# Patient Record
Sex: MALE | Race: WHITE | NOT HISPANIC OR LATINO | Employment: OTHER | URBAN - METROPOLITAN AREA
[De-identification: names, ages, dates, MRNs, and addresses within clinical notes are randomized per-mention and may not be internally consistent; named-entity substitution may affect disease eponyms.]

---

## 2017-12-28 ENCOUNTER — OFFICE VISIT (OUTPATIENT)
Dept: CARDIOLOGY | Facility: CLINIC | Age: 37
End: 2017-12-28

## 2017-12-28 VITALS
WEIGHT: 194 LBS | BODY MASS INDEX: 27.77 KG/M2 | HEART RATE: 57 BPM | HEIGHT: 70 IN | SYSTOLIC BLOOD PRESSURE: 142 MMHG | DIASTOLIC BLOOD PRESSURE: 100 MMHG

## 2017-12-28 DIAGNOSIS — R07.2 PRECORDIAL PAIN: Primary | ICD-10-CM

## 2017-12-28 PROCEDURE — 99204 OFFICE O/P NEW MOD 45 MIN: CPT | Performed by: INTERNAL MEDICINE

## 2017-12-28 PROCEDURE — 93000 ELECTROCARDIOGRAM COMPLETE: CPT | Performed by: INTERNAL MEDICINE

## 2017-12-28 RX ORDER — AMLODIPINE BESYLATE 2.5 MG/1
2.5 TABLET ORAL DAILY
Qty: 30 TABLET | Refills: 11 | Status: SHIPPED | OUTPATIENT
Start: 2017-12-28 | End: 2019-03-27 | Stop reason: SDUPTHER

## 2018-01-08 NOTE — PROGRESS NOTES
Subjective:     Encounter Date:12/28/2017      Patient ID: Julius Gonzales is a 37 y.o. male.    Chief Complaint:  Hypertension   This is a chronic problem. The current episode started more than 1 month ago. Associated symptoms include chest pain and peripheral edema. Pertinent negatives include no malaise/fatigue or shortness of breath.   Chest Pain    This is a recurrent problem. The current episode started more than 1 month ago. The problem occurs intermittently. The problem has been waxing and waning. Pertinent negatives include no malaise/fatigue or shortness of breath.       37-year-old gentleman who presents today for evaluation.  Patient was recently seen by allergist in [facility].  Patient was on an ACE inhibitor and started to have angioedema.  He been on an ACE inhibitor for a long time.  He was first diagnosed with hypertension in his 20s.  He's been on multiple medications and unfortunately has not tolerated several of them.  The last was lisinopril which clearly gave him symptoms of angioedema.  He finally showed me a picture of where his lipid actually swallow which is obviously classic.  He wished to find a new cardiologist and he subsequently presented to my office for further evaluation.             Review of Systems   Constitution: Negative for malaise/fatigue.   Cardiovascular: Positive for chest pain.   Respiratory: Negative for shortness of breath.    Psychiatric/Behavioral: The patient is nervous/anxious.    All other systems reviewed and are negative.        ECG 12 Lead  Date/Time: 12/28/2017 4:43 PM  Performed by: ANEL GIBBONS  Authorized by: ANEL GIBBONS   Comparison: compared with previous ECG from 10/6/2017  Similar to previous ECG  Rhythm: sinus rhythm  Clinical impression: normal ECG               Objective:     Physical Exam   Constitutional: He is oriented to person, place, and time. He appears well-developed.   HENT:   Head: Normocephalic.   Eyes: Conjunctivae are normal.    Neck: Normal range of motion.   Cardiovascular: Normal rate, regular rhythm and normal heart sounds.    Pulmonary/Chest: Breath sounds normal.   Abdominal: Soft. Bowel sounds are normal.   Musculoskeletal: Normal range of motion. He exhibits no edema.   Neurological: He is alert and oriented to person, place, and time.   Skin: Skin is warm and dry.   Psychiatric: He has a normal mood and affect. His behavior is normal.   Vitals reviewed.      Lab Review:       Assessment:          Diagnosis Plan   1. Precordial pain  Adult Transthoracic Echo Complete W/ Cont if Necessary Per Protocol          Plan:        1.  Hypertension.  Patient clearly has angioedema from ACE inhibitor.  He's also taken and are which had similar reaction.  He also had some issues with hydrochlorothiazide which probably makes him sensitive to a sulfa-based medicine in addition.  I will initiate Norvasc 2.5 mg and see how he responds.  Blood pressures up today not terrible.  Multiple surgical and ascending to Dr. Lyons of allergy for another opinion and possible desensitization.  Patient's total follow-up in 4 weeks for reassessment.  2.  Patient's having some atypical chest discomfort.  At this point I don't think it represents a cardiac etiology but I am when a set him up for an echocardiogram for multiple reasons.  I will see how thick his LV walls are as well as look for wall motion abnormalities.  At this point it could be his blood pressure causing some of his symptoms.  3.  Will address his smokeless tobacco on next visit.    4. Follow-up 4 - 6 weeks

## 2018-02-06 ENCOUNTER — APPOINTMENT (OUTPATIENT)
Dept: CARDIOLOGY | Facility: HOSPITAL | Age: 38
End: 2018-02-06
Attending: INTERNAL MEDICINE

## 2018-02-06 DIAGNOSIS — R07.2 PRECORDIAL PAIN: Primary | ICD-10-CM

## 2018-03-01 ENCOUNTER — OFFICE VISIT CONVERTED (OUTPATIENT)
Dept: FAMILY MEDICINE CLINIC | Facility: CLINIC | Age: 38
End: 2018-03-01
Attending: NURSE PRACTITIONER

## 2018-03-30 ENCOUNTER — OFFICE VISIT (OUTPATIENT)
Dept: CARDIOLOGY | Facility: CLINIC | Age: 38
End: 2018-03-30

## 2018-03-30 ENCOUNTER — HOSPITAL ENCOUNTER (OUTPATIENT)
Dept: CARDIOLOGY | Facility: HOSPITAL | Age: 38
Discharge: HOME | End: 2018-03-30
Attending: INTERNAL MEDICINE | Admitting: INTERNAL MEDICINE

## 2018-03-30 VITALS
HEART RATE: 69 BPM | BODY MASS INDEX: 27.77 KG/M2 | OXYGEN SATURATION: 98 % | SYSTOLIC BLOOD PRESSURE: 132 MMHG | DIASTOLIC BLOOD PRESSURE: 90 MMHG | HEIGHT: 70 IN | WEIGHT: 194 LBS

## 2018-03-30 VITALS
HEIGHT: 70 IN | WEIGHT: 194 LBS | SYSTOLIC BLOOD PRESSURE: 132 MMHG | BODY MASS INDEX: 27.77 KG/M2 | HEART RATE: 76 BPM | DIASTOLIC BLOOD PRESSURE: 90 MMHG

## 2018-03-30 DIAGNOSIS — R07.2 PRECORDIAL PAIN: ICD-10-CM

## 2018-03-30 DIAGNOSIS — I10 ESSENTIAL HYPERTENSION: Primary | ICD-10-CM

## 2018-03-30 LAB
ASCENDING AORTA: 2.6 CM
BH CV ECHO MEAS - ACS: 2.1 CM
BH CV ECHO MEAS - AO MAX PG (FULL): 1 MMHG
BH CV ECHO MEAS - AO MAX PG: 5.3 MMHG
BH CV ECHO MEAS - AO MEAN PG (FULL): 0.85 MMHG
BH CV ECHO MEAS - AO MEAN PG: 3 MMHG
BH CV ECHO MEAS - AO ROOT AREA (BSA CORRECTED): 1.8
BH CV ECHO MEAS - AO ROOT AREA: 11.3 CM^2
BH CV ECHO MEAS - AO ROOT DIAM: 3.8 CM
BH CV ECHO MEAS - AO V2 MAX: 115.4 CM/SEC
BH CV ECHO MEAS - AO V2 MEAN: 81.1 CM/SEC
BH CV ECHO MEAS - AO V2 VTI: 24.5 CM
BH CV ECHO MEAS - AVA(I,A): 2.4 CM^2
BH CV ECHO MEAS - AVA(I,D): 2.4 CM^2
BH CV ECHO MEAS - AVA(V,A): 2.9 CM^2
BH CV ECHO MEAS - AVA(V,D): 2.9 CM^2
BH CV ECHO MEAS - BSA(HAYCOCK): 2.1 M^2
BH CV ECHO MEAS - BSA: 2.1 M^2
BH CV ECHO MEAS - BZI_BMI: 27.8 KILOGRAMS/M^2
BH CV ECHO MEAS - BZI_METRIC_HEIGHT: 177.8 CM
BH CV ECHO MEAS - BZI_METRIC_WEIGHT: 88 KG
BH CV ECHO MEAS - CONTRAST EF (2CH): 63.4 ML/M^2
BH CV ECHO MEAS - CONTRAST EF 4CH: 54.5 ML/M^2
BH CV ECHO MEAS - EDV(MOD-SP2): 41 ML
BH CV ECHO MEAS - EDV(MOD-SP4): 55 ML
BH CV ECHO MEAS - EDV(TEICH): 122.8 ML
BH CV ECHO MEAS - EF(CUBED): 84.7 %
BH CV ECHO MEAS - EF(MOD-SP2): 63.4 %
BH CV ECHO MEAS - EF(MOD-SP4): 54.5 %
BH CV ECHO MEAS - EF(TEICH): 77.6 %
BH CV ECHO MEAS - ESV(MOD-SP2): 15 ML
BH CV ECHO MEAS - ESV(MOD-SP4): 25 ML
BH CV ECHO MEAS - ESV(TEICH): 27.5 ML
BH CV ECHO MEAS - FS: 46.5 %
BH CV ECHO MEAS - IVS/LVPW: 1
BH CV ECHO MEAS - IVSD: 0.93 CM
BH CV ECHO MEAS - LAT PEAK E' VEL: 12 CM/SEC
BH CV ECHO MEAS - LV DIASTOLIC VOL/BSA (35-75): 26.7 ML/M^2
BH CV ECHO MEAS - LV MASS(C)D: 169.9 GRAMS
BH CV ECHO MEAS - LV MASS(C)DI: 82.5 GRAMS/M^2
BH CV ECHO MEAS - LV MAX PG: 4.3 MMHG
BH CV ECHO MEAS - LV MEAN PG: 2.2 MMHG
BH CV ECHO MEAS - LV SYSTOLIC VOL/BSA (12-30): 12.1 ML/M^2
BH CV ECHO MEAS - LV V1 MAX: 103.5 CM/SEC
BH CV ECHO MEAS - LV V1 MEAN: 66.3 CM/SEC
BH CV ECHO MEAS - LV V1 VTI: 18.4 CM
BH CV ECHO MEAS - LVIDD: 5.1 CM
BH CV ECHO MEAS - LVIDS: 2.7 CM
BH CV ECHO MEAS - LVLD AP2: 7.5 CM
BH CV ECHO MEAS - LVLD AP4: 6.7 CM
BH CV ECHO MEAS - LVLS AP2: 6.5 CM
BH CV ECHO MEAS - LVLS AP4: 6.2 CM
BH CV ECHO MEAS - LVOT AREA (M): 3.1 CM^2
BH CV ECHO MEAS - LVOT AREA: 3.2 CM^2
BH CV ECHO MEAS - LVOT DIAM: 2 CM
BH CV ECHO MEAS - LVPWD: 0.93 CM
BH CV ECHO MEAS - MED PEAK E' VEL: 8 CM/SEC
BH CV ECHO MEAS - MV A DUR: 0.13 SEC
BH CV ECHO MEAS - MV A MAX VEL: 60.6 CM/SEC
BH CV ECHO MEAS - MV DEC SLOPE: 471.6 CM/SEC^2
BH CV ECHO MEAS - MV DEC TIME: 0.18 SEC
BH CV ECHO MEAS - MV E MAX VEL: 89.7 CM/SEC
BH CV ECHO MEAS - MV E/A: 1.5
BH CV ECHO MEAS - MV MAX PG: 2.9 MMHG
BH CV ECHO MEAS - MV MEAN PG: 1.4 MMHG
BH CV ECHO MEAS - MV P1/2T MAX VEL: 89.7 CM/SEC
BH CV ECHO MEAS - MV P1/2T: 55.7 MSEC
BH CV ECHO MEAS - MV V2 MAX: 85.5 CM/SEC
BH CV ECHO MEAS - MV V2 MEAN: 55.8 CM/SEC
BH CV ECHO MEAS - MV V2 VTI: 29.3 CM
BH CV ECHO MEAS - MVA P1/2T LCG: 2.5 CM^2
BH CV ECHO MEAS - MVA(P1/2T): 3.9 CM^2
BH CV ECHO MEAS - MVA(VTI): 2 CM^2
BH CV ECHO MEAS - PA ACC TIME: 0.12 SEC
BH CV ECHO MEAS - PA MAX PG (FULL): 2.2 MMHG
BH CV ECHO MEAS - PA MAX PG: 4.3 MMHG
BH CV ECHO MEAS - PA PR(ACCEL): 23.5 MMHG
BH CV ECHO MEAS - PA V2 MAX: 103.2 CM/SEC
BH CV ECHO MEAS - PULM A REVS DUR: 0.11 SEC
BH CV ECHO MEAS - PULM A REVS VEL: 24.3 CM/SEC
BH CV ECHO MEAS - PULM DIAS VEL: 41.1 CM/SEC
BH CV ECHO MEAS - PULM S/D: 1.2
BH CV ECHO MEAS - PULM SYS VEL: 48.6 CM/SEC
BH CV ECHO MEAS - PVA(V,A): 2.1 CM^2
BH CV ECHO MEAS - PVA(V,D): 2.1 CM^2
BH CV ECHO MEAS - QP/QS: 0.78
BH CV ECHO MEAS - RAP SYSTOLE: 3 MMHG
BH CV ECHO MEAS - RV MAX PG: 2.1 MMHG
BH CV ECHO MEAS - RV MEAN PG: 1 MMHG
BH CV ECHO MEAS - RV V1 MAX: 72.3 CM/SEC
BH CV ECHO MEAS - RV V1 MEAN: 45.9 CM/SEC
BH CV ECHO MEAS - RV V1 VTI: 15.2 CM
BH CV ECHO MEAS - RVOT AREA: 3 CM^2
BH CV ECHO MEAS - RVOT DIAM: 2 CM
BH CV ECHO MEAS - RVSP: 16 MMHG
BH CV ECHO MEAS - SI(AO): 134.7 ML/M^2
BH CV ECHO MEAS - SI(CUBED): 54 ML/M^2
BH CV ECHO MEAS - SI(LVOT): 28.9 ML/M^2
BH CV ECHO MEAS - SI(MOD-SP2): 12.6 ML/M^2
BH CV ECHO MEAS - SI(MOD-SP4): 14.6 ML/M^2
BH CV ECHO MEAS - SI(TEICH): 46.3 ML/M^2
BH CV ECHO MEAS - SUP REN AO DIAM: 1.8 CM
BH CV ECHO MEAS - SV(AO): 277.5 ML
BH CV ECHO MEAS - SV(CUBED): 111.2 ML
BH CV ECHO MEAS - SV(LVOT): 59.5 ML
BH CV ECHO MEAS - SV(MOD-SP2): 26 ML
BH CV ECHO MEAS - SV(MOD-SP4): 30 ML
BH CV ECHO MEAS - SV(RVOT): 46.3 ML
BH CV ECHO MEAS - SV(TEICH): 95.3 ML
BH CV ECHO MEAS - TAPSE (>1.6): 2 CM2
BH CV ECHO MEAS - TR MAX VEL: 180.5 CM/SEC
BH CV XLRA - RV BASE: 3 CM
BH CV XLRA - TDI S': 14 CM/SEC
E/E' RATIO: 10
LEFT ATRIUM VOLUME INDEX: 16 ML/M2
SINUS: 3 CM
STJ: 2.6 CM

## 2018-03-30 PROCEDURE — 93306 TTE W/DOPPLER COMPLETE: CPT | Performed by: INTERNAL MEDICINE

## 2018-03-30 PROCEDURE — 93306 TTE W/DOPPLER COMPLETE: CPT

## 2018-03-30 PROCEDURE — 99213 OFFICE O/P EST LOW 20 MIN: CPT | Performed by: INTERNAL MEDICINE

## 2018-03-30 NOTE — PROGRESS NOTES
Subjective:     Encounter Date:12/28/2017      Patient ID: Julius Gonzales is a 38 y.o. male.    Chief Complaint:  Hypertension   This is a chronic problem. The current episode started more than 1 month ago. Associated symptoms include peripheral edema. Pertinent negatives include no malaise/fatigue or shortness of breath.   Chest Pain    This is a recurrent problem. The current episode started more than 1 month ago. The problem occurs intermittently. The problem has been waxing and waning. Pertinent negatives include no malaise/fatigue or shortness of breath.       37-year-old gentleman who presents today for Reevaluation.  Blood pressures actually looking good he is tolerating his Norvasc with no issues.  He also has discovered that he's having angioedema to NSAIDs.           Review of Systems   Constitution: Negative for malaise/fatigue.   Respiratory: Negative for shortness of breath.    Gastrointestinal: Positive for diarrhea.   Psychiatric/Behavioral: The patient is nervous/anxious.    All other systems reviewed and are negative.      Procedures         Objective:     Physical Exam   Constitutional: He is oriented to person, place, and time. He appears well-developed.   HENT:   Head: Normocephalic.   Eyes: Conjunctivae are normal.   Neck: Normal range of motion.   Cardiovascular: Normal rate, regular rhythm and normal heart sounds.    Pulmonary/Chest: Breath sounds normal.   Abdominal: Soft. Bowel sounds are normal.   Musculoskeletal: Normal range of motion. He exhibits no edema.   Neurological: He is alert and oriented to person, place, and time.   Skin: Skin is warm and dry.   Psychiatric: He has a normal mood and affect. His behavior is normal.   Vitals reviewed.      Lab Review:       Assessment:         No diagnosis found.       Plan:        1.  Hypertension.  Blood pressure is significantly better tolerating medicines.  2.  Patient's having some atypical chest discomfort.  No further chest pain.  3.   Patient is entering into a divorce proceedings.  We'll discuss discontinuing his smokeless tobacco in the future.   4.  Follow-up 6 months

## 2018-05-02 ENCOUNTER — CONVERSION ENCOUNTER (OUTPATIENT)
Dept: FAMILY MEDICINE CLINIC | Facility: CLINIC | Age: 38
End: 2018-05-02

## 2018-05-02 ENCOUNTER — OFFICE VISIT CONVERTED (OUTPATIENT)
Dept: FAMILY MEDICINE CLINIC | Facility: CLINIC | Age: 38
End: 2018-05-02
Attending: NURSE PRACTITIONER

## 2018-09-19 ENCOUNTER — OFFICE VISIT CONVERTED (OUTPATIENT)
Dept: FAMILY MEDICINE CLINIC | Facility: CLINIC | Age: 38
End: 2018-09-19
Attending: NURSE PRACTITIONER

## 2019-03-27 ENCOUNTER — OFFICE VISIT (OUTPATIENT)
Dept: CARDIOLOGY | Facility: CLINIC | Age: 39
End: 2019-03-27

## 2019-03-27 VITALS
SYSTOLIC BLOOD PRESSURE: 126 MMHG | WEIGHT: 189 LBS | DIASTOLIC BLOOD PRESSURE: 92 MMHG | BODY MASS INDEX: 27.06 KG/M2 | HEIGHT: 70 IN | OXYGEN SATURATION: 99 % | HEART RATE: 77 BPM

## 2019-03-27 DIAGNOSIS — I10 ESSENTIAL HYPERTENSION: Primary | ICD-10-CM

## 2019-03-27 PROCEDURE — 99214 OFFICE O/P EST MOD 30 MIN: CPT | Performed by: NURSE PRACTITIONER

## 2019-03-27 PROCEDURE — 93000 ELECTROCARDIOGRAM COMPLETE: CPT | Performed by: NURSE PRACTITIONER

## 2019-03-27 RX ORDER — AMLODIPINE BESYLATE 5 MG/1
5 TABLET ORAL DAILY
Start: 2019-03-27

## 2019-03-27 RX ORDER — SILDENAFIL CITRATE 20 MG/1
TABLET ORAL
Refills: 11 | COMMUNITY
Start: 2019-03-19

## 2019-03-27 NOTE — PROGRESS NOTES
"[facility]      Patient Name: Julius Gonzales  :1980  Age: 39 y.o.  Primary Cardiologist: Prakash Stewart MD  Encounter Provider:  LYDIA Shah      Chief Complaint:   Chief Complaint   Patient presents with   • Follow-up     1 year         HPI  Julius Gonzales is a 39 y.o. male with a history significant for hypertension.  Patient presents today for annual he evaluation.  Patient is new to me but I reviewed his prior medical records.  Patient states that all in all he feels like he has done well over the past year but he states that his blood pressure is elevating.  He states that at home he routinely gets readings in the 140s-150s/90s.  He states that when his blood pressure elevates he does have some intermittent headaches.  He denies any chest pain, shortness of breath, palpitations, lightheadedness, swelling, fatigue.    The following portions of the patient's history were reviewed and updated as appropriate: allergies, current medications, past family history, past medical history, past social history, past surgical history and problem list.    Current Outpatient Medications on File Prior to Visit   Medication Sig   • amLODIPine (NORVASC) 2.5 MG tablet Take 1 tablet by mouth Daily.   • sildenafil (REVATIO) 20 MG tablet TAKE 1-3 tablets BY MOUTH 1 hour prior TO intercourse     No current facility-administered medications on file prior to visit.          Review of Systems   Constitution: Negative for malaise/fatigue.   Cardiovascular: Negative for chest pain and leg swelling.   Respiratory: Negative for shortness of breath.    Neurological: Negative for light-headedness.   All other systems reviewed and are negative.      OBJECTIVE:   Vital Signs  Vitals:    19 0949   BP: 126/92   Pulse: 77   SpO2: 99%     Estimated body mass index is 27.12 kg/m² as calculated from the following:    Height as of this encounter: 177.8 cm (70\").    Weight as of this encounter: 85.7 kg (189 " lb).    Physical Exam   Constitutional: He is oriented to person, place, and time. Vital signs are normal. He appears well-developed and well-nourished.   Eyes: Conjunctivae are normal.   Neck: Carotid bruit is not present.   Cardiovascular: Normal rate, regular rhythm and normal heart sounds.   No murmur heard.  Pulmonary/Chest: Effort normal and breath sounds normal.   Abdominal: Normal appearance.   Musculoskeletal: Normal range of motion.   No pedal edema   Neurological: He is alert and oriented to person, place, and time. GCS eye subscore is 4. GCS verbal subscore is 5. GCS motor subscore is 6.   Skin: Skin is warm and dry.   Psychiatric: He has a normal mood and affect. His speech is normal and behavior is normal. Judgment and thought content normal. Cognition and memory are normal.         ECG 12 Lead  Date/Time: 3/27/2019 9:55 AM  Performed by: Verónica Sarah APRN  Authorized by: Verónica Sarah APRN   Comparison: compared with previous ECG from 12/28/2017  Rhythm: sinus rhythm  Rate: normal  BPM: 71  Conduction: conduction normal  ST Segments: ST segments normal  QRS axis: normal  Other: no other findings    Clinical impression: normal ECG  Comments: Baseline artifact            Cardiac Procedures:  1. Echocardiogram 3/30/18.  EF 54%.  Normal echocardiogram.        ASSESSMENT:      Diagnosis Plan   1. Essential hypertension           PLAN OF CARE:     1. Hypertension.  Patient reports that he has had some elevated readings on his blood pressure at home in the 140s-150s/90s.  He states that when he has high readings he does have headaches.  Diastolic blood pressure is slightly elevated today with a blood pressure reading of 126/92.  Patient states that he is handling amlodipine has not had any experiences with angioedema or lower extremity edema.  At this time we will increase patient's amlodipine from 2.5 mg daily to 5 mg daily.  Patient will let me know if he tolerates this higher dose and if we do  we will send a new prescription to his pharmacy.  2. Follow-up with Dr. Stewart in 1 year.  Sooner for problems or complications.          Thank you for allowing me to participate in the care of your patient,      Sincerely,   LYDIA Shah  [facility]  03/27/19  10:02 AM    **Tamara Disclaimer:**  Much of this encounter note is an electronic transcription/translation of spoken language to printed text. The electronic translation of spoken language may permit erroneous, or at times, nonsensical words or phrases to be inadvertently transcribed. Although I have reviewed the note for such errors, some may still exist.

## 2020-02-21 ENCOUNTER — HOSPITAL ENCOUNTER (OUTPATIENT)
Dept: URGENT CARE | Facility: CLINIC | Age: 40
Discharge: HOME | End: 2020-02-21

## 2020-03-03 ENCOUNTER — HOSPITAL ENCOUNTER (OUTPATIENT)
Dept: LAB | Facility: HOSPITAL | Age: 40
Discharge: HOME | End: 2020-03-03
Attending: NURSE PRACTITIONER

## 2020-03-03 ENCOUNTER — OFFICE VISIT CONVERTED (OUTPATIENT)
Dept: FAMILY MEDICINE CLINIC | Facility: CLINIC | Age: 40
End: 2020-03-03
Attending: NURSE PRACTITIONER

## 2020-03-03 LAB
ALBUMIN SERPL-MCNC: 4.6 G/DL (ref 3.5–5)
ALBUMIN/GLOB SERPL: 1.4 {RATIO} (ref 1.4–2.6)
ALP SERPL-CCNC: 60 U/L (ref 53–128)
ALT SERPL-CCNC: 32 U/L (ref 10–40)
ANION GAP SERPL CALC-SCNC: 19 MMOL/L (ref 8–19)
AST SERPL-CCNC: 27 U/L (ref 15–50)
BASOPHILS # BLD AUTO: 0.05 10*3/UL (ref 0–0.2)
BASOPHILS NFR BLD AUTO: 0.9 % (ref 0–3)
BILIRUB SERPL-MCNC: 0.63 MG/DL (ref 0.2–1.3)
BUN SERPL-MCNC: 7 MG/DL (ref 5–25)
BUN/CREAT SERPL: 8 {RATIO} (ref 6–20)
CALCIUM SERPL-MCNC: 9.7 MG/DL (ref 8.7–10.4)
CHLORIDE SERPL-SCNC: 100 MMOL/L (ref 99–111)
CHOLEST SERPL-MCNC: 232 MG/DL (ref 107–200)
CHOLEST/HDLC SERPL: 5.7 {RATIO} (ref 3–6)
CONV ABS IMM GRAN: 0.01 10*3/UL (ref 0–0.2)
CONV CO2: 25 MMOL/L (ref 22–32)
CONV IMMATURE GRAN: 0.2 % (ref 0–1.8)
CONV TOTAL PROTEIN: 7.8 G/DL (ref 6.3–8.2)
CREAT UR-MCNC: 0.88 MG/DL (ref 0.7–1.2)
DEPRECATED RDW RBC AUTO: 37.2 FL (ref 35.1–43.9)
EOSINOPHIL # BLD AUTO: 0.01 10*3/UL (ref 0–0.7)
EOSINOPHIL # BLD AUTO: 0.2 % (ref 0–7)
ERYTHROCYTE [DISTWIDTH] IN BLOOD BY AUTOMATED COUNT: 11.4 % (ref 11.6–14.4)
GFR SERPLBLD BASED ON 1.73 SQ M-ARVRAT: >60 ML/MIN/{1.73_M2}
GLOBULIN UR ELPH-MCNC: 3.2 G/DL (ref 2–3.5)
GLUCOSE SERPL-MCNC: 94 MG/DL (ref 70–99)
HCT VFR BLD AUTO: 48.2 % (ref 42–52)
HDLC SERPL-MCNC: 41 MG/DL (ref 40–60)
HGB BLD-MCNC: 15.7 G/DL (ref 14–18)
LDLC SERPL CALC-MCNC: 170 MG/DL (ref 70–100)
LYMPHOCYTES # BLD AUTO: 1.84 10*3/UL (ref 1–5)
LYMPHOCYTES NFR BLD AUTO: 32.2 % (ref 20–45)
MCH RBC QN AUTO: 28.6 PG (ref 27–31)
MCHC RBC AUTO-ENTMCNC: 32.6 G/DL (ref 33–37)
MCV RBC AUTO: 88 FL (ref 80–96)
MONOCYTES # BLD AUTO: 0.35 10*3/UL (ref 0.2–1.2)
MONOCYTES NFR BLD AUTO: 6.1 % (ref 3–10)
NEUTROPHILS # BLD AUTO: 3.45 10*3/UL (ref 2–8)
NEUTROPHILS NFR BLD AUTO: 60.4 % (ref 30–85)
NRBC CBCN: 0 % (ref 0–0.7)
OSMOLALITY SERPL CALC.SUM OF ELEC: 288 MOSM/KG (ref 273–304)
PLATELET # BLD AUTO: 306 10*3/UL (ref 130–400)
PMV BLD AUTO: 9.5 FL (ref 9.4–12.4)
POTASSIUM SERPL-SCNC: 4.3 MMOL/L (ref 3.5–5.3)
RBC # BLD AUTO: 5.48 10*6/UL (ref 4.7–6.1)
SODIUM SERPL-SCNC: 140 MMOL/L (ref 135–147)
T4 FREE SERPL-MCNC: 1.5 NG/DL (ref 0.9–1.8)
TRIGL SERPL-MCNC: 105 MG/DL (ref 40–150)
TSH SERPL-ACNC: 1.1 M[IU]/L (ref 0.27–4.2)
VLDLC SERPL-MCNC: 21 MG/DL (ref 5–37)
WBC # BLD AUTO: 5.71 10*3/UL (ref 4.8–10.8)

## 2020-04-14 ENCOUNTER — TELEMEDICINE CONVERTED (OUTPATIENT)
Dept: FAMILY MEDICINE CLINIC | Facility: CLINIC | Age: 40
End: 2020-04-14
Attending: NURSE PRACTITIONER

## 2020-07-21 ENCOUNTER — TELEMEDICINE CONVERTED (OUTPATIENT)
Dept: FAMILY MEDICINE CLINIC | Facility: CLINIC | Age: 40
End: 2020-07-21
Attending: NURSE PRACTITIONER

## 2021-01-09 ENCOUNTER — HOSPITAL ENCOUNTER (OUTPATIENT)
Dept: URGENT CARE | Facility: CLINIC | Age: 41
Discharge: HOME | End: 2021-01-09
Attending: NURSE PRACTITIONER

## 2021-01-11 LAB — SARS-COV-2 RNA SPEC QL NAA+PROBE: DETECTED

## 2021-05-12 NOTE — PROGRESS NOTES
Progress Note      Patient Name: Julius Gonzales Jr.   Patient ID: [id number]   Sex: Male   YOB: 1980    Primary Care Provider: Carol FREEMAN   Referring Provider: Carol FREEMAN    Visit Date: April 14, 2020    Provider: LYDIA Rollins   Location: [facility]   Location Address: [address] [place] [address], [address]  [place]  [id number]   Location Phone: [phone]          Chief Complaint  · F/u meds      History Of Present Illness  Video Conferencing Visit  Julius Gonzales Jr. is a 40 year old /White male who is presenting for evaluation via video conferencing. Verbal consent obtained before beginning visit.   The following staff were present during this visit: Maria Victoria Funes MA   Julius Gonzales Jr. is a 40 year old /White male who presents for evaluation and treatment of:      he is here on facetime today and he did genesight and was put on Pristiq and feels great and his bp is good as best as it has ever been he is having some anorgasmia we will try to countereffect with buproprion SR prior to intercourse  he is using Ativan to sleep and this works well for him he is not using it during the day...he has had labs done and these have been reviewed today  he has elevated lipids we have discussed this and he is willing to begin some statins       Past Medical History  Disease Name Date Onset Notes   Hyperlipidemia --  --    Hypertension --  --          Past Surgical History  Procedure Name Date Notes   Hernia 2001 --          Medication List  Name Date Started Instructions   lorazepam 2 mg oral tablet 03/10/2020 take 1 tablet (2 mg) by oral route 2 times per day as needed for 30 days   Norvasc 2.5 mg oral tablet 03/03/2020 take 1 tablet by oral route 2 times a day for 90 days   Pristiq 50 mg oral tablet extended release 24 hr 03/09/2020 take 1 tablet (50 mg) by oral route once daily in am   propranolol 60 mg oral capsule,extended release 24 hr 03/03/2020 take 1  capsule (60 mg) by oral route once daily qhs   sildenafil 50 mg oral tablet 03/03/2020 take 1 tablet (50 mg) by oral route once daily as needed approximately 1 hour before sexual activity for 30 days         Allergy List  Allergen Name Date Reaction Notes   NO KNOWN DRUG ALLERGIES --  --  --    Diovan HCT --  Swelling --    NSAIDS --  --  angioedema         Family Medical History  Disease Name Relative/Age Notes   Hyperlipidemia Father/   --    Hypertension Father/   --    Heart Attack (MI) Grandfather (maternal)/  Grandmother (maternal)/  Grandmother (paternal)/  Uncle/   --          Social History  Finding Status Start/Stop Quantity Notes   Alcohol Current some day --/-- occasionally --     --  --/-- --  --    No known infection risk --  --/-- --  --    Smokeless tobacco Current every day 15/-- 1 can 05/02/2018 - currently everyday    Tobacco Never --/-- --  05/02/2018 - never never been a smoker         Immunizations  NameDate Admin Mfg Trade Name Lot Number Route Inj VIS Given VIS Publication   InfluenzaRefused 03/10/2016 NE Not Entered  NE NE     Comments: Pt refused.   InfluenzaRefused 09/09/2015 NE Not Entered  NE NE     Comments: Pt does not get flu vaccine.   Tdap09/01/2012 NE Not Entered  NE NE 09/09/2015 01/24/2012   Comments:          Review of Systems  · Constitutional  o Admits  o : insomnia  o Denies  o : fever, weight gain, weight loss, malaise/fatigue  · Eyes  o Denies  o : diplopia, recent changes, blurred vision  · HENT  o Denies  o : sore throat, hearing changes, tinnitus, nose bleeding, sinus pain, nasal discharge, ear pain  · Breasts  o Denies  o : lumps, tenderness, swelling, nipple discharge  · Cardiovascular  o Admits  o : HTN, hyperlipidemia  o Denies  o : palpitation, chest pain, claudication, pedal edema  · Respiratory  o Denies  o : shortness of breath, ALCALA, cough  · Gastrointestinal  o Denies  o : nausea, vomiting, reflux, diarrhea, constipation, abdominal pain, blood in  stools  · Genitourinary  o Admits  o : anorgasmia  o Denies  o : frequency, urgency, dysuria, incontinence, nocturia,  · Neurologic  o Denies  o : unsteady gait, weakness, dizziness, H/A  · Musculoskeletal  o Denies  o : myalgias, joint pain, joint swelling  · Endocrine  o Denies  o : heat intolerance, cold intolerance, polyuria, polydipsia  · Psychiatric  o Admits  o : anx/dep  o Denies  o : suicidal ideation, homicidal ideation, mood changes, hallucinations, memory  · Heme-Lymph  o Denies  o : easy bleeding, easy bruising, edema, lymph node enlargement or tenderness  · Allergic-Immunologic  o Denies  o : bees, frequent illnesses, seasonal allergies      Physical Examination  · Constitutional  o Appearance  o : well-nourished, well developed, alert, in no acute distress  · Head and Face  o Head  o :   § Inspection  § : atraumatic, normocephalic  o Face  o :   § Inspection  § : no facial lesions  · Ears, Nose, Mouth and Throat  o Ears  o :   § External Ears  § : appearance within normal limits, no lesions present  o Nose  o :   § External Nose  § : normal stucture noted.  o Oral Cavity  o :   § Lips  § : lip appearance normal  · Neck  o Inspection/Palpation  o : normal appearance, no masses or tenderness, trachea midline, no deformities  o Range of Motion  o : range of motion within normal limits  · Respiratory  o Respiratory Effort  o : breathing unlabored  · Skin and Subcutaneous Tissue  o General Inspection  o : no rashes or lesions present, no areas of discoloration  · Neurologic  o Mental Status Examination  o :   § Orientation  § : grossly oriented to person, place and time  o Cranial Nerves  o : cranial nerves intact and symmetric throughout  · Psychiatric  o Mood and Affect  o : mood normal, affect appropriate, denies any SI/HI              Assessment  · Anxiety disorder     300.00/F41.9  · Depression     311/F32.9  · Hyperlipidemia     272.4/E78.5  · Insomnia, unspecified     780.52/G47.00  · Other long  term (current) drug therapy     V58.69/Z79.899  · 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  · Side effect of medication     995.20/T88.7XXA      Plan  · Orders  o CMP [facility] ([id number]) - V58.69/Z79.899, 272.4/E78.5 - 07/01/2020  o Lipid Panel [facility] ([id number]) - V58.69/Z79.899, 272.4/E78.5 - 07/01/2020  o ACO-39: Current medications updated and reviewed () - - 04/14/2020  · Medications  o atorvastatin 10 mg oral tablet   SIG: take 1 tablet (10 mg) by oral route once daily at bedtime for 90 days   DISP: (90) tablets with 3 refills  Prescribed on 04/14/2020     o Wellbutrin  mg oral tablet sustained-release 12 hr   SIG: take 1 tablet (150 mg) by oral route once daily 1-2 hours prior to sexual activity   DISP: (90) Tablet sustained-release 12 hrs with 3 refills  Prescribed on 04/14/2020     o Medications have been Reconciled  o Transition of Care or Provider Policy  · Instructions  o Advised that cheeses and other sources of dairy fats, animal fats, fast food, and the extras (candy, pastries, pies, doughnuts and cookies) all contain LDL raising nutrients. Advised to increase fruits, vegetables, whole grains, and to monitor portion sizes.   o Handouts were given to patient: lab req and appt  o Patient is taking medications as prescribed and doing well.   o Take all medications as prescribed/directed.  o Patient was educated/instructed on their diagnosis, treatment and medications prior to discharge from the clinic today.  o Patient instructed to seek medical attention urgently for new or worsening symptoms.  o Call the office with any concerns or questions.  o Risks, benefits, and alternatives were discussed with the patient. The patient is aware of risks associated with: medication  o Chronic conditions reviewed and taken into consideration for today's treatment plan.  · Disposition  o Call or Return if symptoms worsen or persist.  o follow up 3 months  o follow up prn or as needed  o Care  Transition            Electronically Signed by: LYDIA Rollins -Author on April 18, 2020 08:15:02 AM

## 2021-05-15 VITALS
SYSTOLIC BLOOD PRESSURE: 146 MMHG | BODY MASS INDEX: 28.2 KG/M2 | DIASTOLIC BLOOD PRESSURE: 98 MMHG | HEIGHT: 70 IN | WEIGHT: 197 LBS | HEART RATE: 78 BPM | OXYGEN SATURATION: 99 %

## 2021-05-16 VITALS
TEMPERATURE: 97.8 F | WEIGHT: 185 LBS | SYSTOLIC BLOOD PRESSURE: 150 MMHG | BODY MASS INDEX: 26.48 KG/M2 | DIASTOLIC BLOOD PRESSURE: 84 MMHG | HEIGHT: 70 IN | HEART RATE: 92 BPM | RESPIRATION RATE: 18 BRPM | OXYGEN SATURATION: 100 %

## 2021-05-16 VITALS
DIASTOLIC BLOOD PRESSURE: 92 MMHG | WEIGHT: 197.37 LBS | HEIGHT: 70 IN | BODY MASS INDEX: 28.26 KG/M2 | SYSTOLIC BLOOD PRESSURE: 140 MMHG | HEART RATE: 68 BPM

## 2021-05-16 VITALS
OXYGEN SATURATION: 98 % | HEIGHT: 70 IN | DIASTOLIC BLOOD PRESSURE: 82 MMHG | SYSTOLIC BLOOD PRESSURE: 134 MMHG | RESPIRATION RATE: 16 BRPM | WEIGHT: 191 LBS | HEART RATE: 90 BPM | BODY MASS INDEX: 27.35 KG/M2 | TEMPERATURE: 99 F

## 2022-02-25 ENCOUNTER — APPOINTMENT (OUTPATIENT)
Dept: CT IMAGING | Facility: HOSPITAL | Age: 42
End: 2022-02-25

## 2022-02-25 ENCOUNTER — HOSPITAL ENCOUNTER (EMERGENCY)
Facility: HOSPITAL | Age: 42
Discharge: HOME | End: 2022-02-25
Attending: EMERGENCY MEDICINE | Admitting: EMERGENCY MEDICINE

## 2022-02-25 VITALS
RESPIRATION RATE: 18 BRPM | WEIGHT: 199.74 LBS | BODY MASS INDEX: 28.59 KG/M2 | OXYGEN SATURATION: 97 % | DIASTOLIC BLOOD PRESSURE: 97 MMHG | TEMPERATURE: 98.4 F | HEIGHT: 70 IN | SYSTOLIC BLOOD PRESSURE: 137 MMHG | HEART RATE: 65 BPM

## 2022-02-25 DIAGNOSIS — I10 ESSENTIAL HYPERTENSION: Primary | ICD-10-CM

## 2022-02-25 LAB
ALBUMIN SERPL-MCNC: 5.1 G/DL (ref 3.5–5.2)
ALBUMIN/GLOB SERPL: 1.6 G/DL
ALP SERPL-CCNC: 90 U/L (ref 39–117)
ALT SERPL W P-5'-P-CCNC: 35 U/L (ref 1–41)
ANION GAP SERPL CALCULATED.3IONS-SCNC: 10.2 MMOL/L (ref 5–15)
AST SERPL-CCNC: 26 U/L (ref 1–40)
BASOPHILS # BLD AUTO: 0.08 10*3/MM3 (ref 0–0.2)
BASOPHILS NFR BLD AUTO: 1.1 % (ref 0–1.5)
BILIRUB SERPL-MCNC: 0.2 MG/DL (ref 0–1.2)
BUN SERPL-MCNC: 9 MG/DL (ref 6–20)
BUN/CREAT SERPL: 9.9 (ref 7–25)
CALCIUM SPEC-SCNC: 10.4 MG/DL (ref 8.6–10.5)
CHLORIDE SERPL-SCNC: 101 MMOL/L (ref 98–107)
CO2 SERPL-SCNC: 29.8 MMOL/L (ref 22–29)
CREAT SERPL-MCNC: 0.91 MG/DL (ref 0.76–1.27)
DEPRECATED RDW RBC AUTO: 37.1 FL (ref 37–54)
EOSINOPHIL # BLD AUTO: 0.08 10*3/MM3 (ref 0–0.4)
EOSINOPHIL NFR BLD AUTO: 1.1 % (ref 0.3–6.2)
ERYTHROCYTE [DISTWIDTH] IN BLOOD BY AUTOMATED COUNT: 11.8 % (ref 12.3–15.4)
GFR SERPL CREATININE-BSD FRML MDRD: 91 ML/MIN/1.73
GLOBULIN UR ELPH-MCNC: 3.2 GM/DL
GLUCOSE SERPL-MCNC: 103 MG/DL (ref 65–99)
HCT VFR BLD AUTO: 47.6 % (ref 37.5–51)
HGB BLD-MCNC: 16.4 G/DL (ref 13–17.7)
IMM GRANULOCYTES # BLD AUTO: 0.02 10*3/MM3 (ref 0–0.05)
IMM GRANULOCYTES NFR BLD AUTO: 0.3 % (ref 0–0.5)
LYMPHOCYTES # BLD AUTO: 2.68 10*3/MM3 (ref 0.7–3.1)
LYMPHOCYTES NFR BLD AUTO: 36.9 % (ref 19.6–45.3)
MCH RBC QN AUTO: 29.5 PG (ref 26.6–33)
MCHC RBC AUTO-ENTMCNC: 34.5 G/DL (ref 31.5–35.7)
MCV RBC AUTO: 85.8 FL (ref 79–97)
MONOCYTES # BLD AUTO: 0.74 10*3/MM3 (ref 0.1–0.9)
MONOCYTES NFR BLD AUTO: 10.2 % (ref 5–12)
NEUTROPHILS NFR BLD AUTO: 3.66 10*3/MM3 (ref 1.7–7)
NEUTROPHILS NFR BLD AUTO: 50.4 % (ref 42.7–76)
NRBC BLD AUTO-RTO: 0 /100 WBC (ref 0–0.2)
PLATELET # BLD AUTO: 314 10*3/MM3 (ref 140–450)
PMV BLD AUTO: 8.7 FL (ref 6–12)
POTASSIUM SERPL-SCNC: 4.4 MMOL/L (ref 3.5–5.2)
PROT SERPL-MCNC: 8.3 G/DL (ref 6–8.5)
RBC # BLD AUTO: 5.55 10*6/MM3 (ref 4.14–5.8)
SODIUM SERPL-SCNC: 141 MMOL/L (ref 136–145)
WBC NRBC COR # BLD: 7.26 10*3/MM3 (ref 3.4–10.8)

## 2022-02-25 PROCEDURE — 99283 EMERGENCY DEPT VISIT LOW MDM: CPT

## 2022-02-25 PROCEDURE — 70450 CT HEAD/BRAIN W/O DYE: CPT

## 2022-02-25 PROCEDURE — 36415 COLL VENOUS BLD VENIPUNCTURE: CPT

## 2022-02-25 PROCEDURE — 85025 COMPLETE CBC W/AUTO DIFF WBC: CPT | Performed by: EMERGENCY MEDICINE

## 2022-02-25 PROCEDURE — 80053 COMPREHEN METABOLIC PANEL: CPT | Performed by: EMERGENCY MEDICINE

## 2022-02-25 RX ORDER — VALSARTAN AND HYDROCHLOROTHIAZIDE 160; 12.5 MG/1; MG/1
1 TABLET, FILM COATED ORAL DAILY
COMMUNITY
End: 2022-09-16

## 2022-02-25 RX ORDER — PROPRANOLOL HCL 60 MG
60 CAPSULE, EXTENDED RELEASE 24HR ORAL DAILY
COMMUNITY
End: 2022-04-13 | Stop reason: ALTCHOICE

## 2022-04-13 ENCOUNTER — OFFICE VISIT (OUTPATIENT)
Dept: CARDIOLOGY | Facility: CLINIC | Age: 42
End: 2022-04-13

## 2022-04-13 VITALS
BODY MASS INDEX: 28.06 KG/M2 | WEIGHT: 196 LBS | HEART RATE: 92 BPM | DIASTOLIC BLOOD PRESSURE: 78 MMHG | HEIGHT: 70 IN | SYSTOLIC BLOOD PRESSURE: 122 MMHG

## 2022-04-13 DIAGNOSIS — I10 PRIMARY HYPERTENSION: Primary | ICD-10-CM

## 2022-04-13 DIAGNOSIS — R07.89 CHEST PAIN, ATYPICAL: ICD-10-CM

## 2022-04-13 PROCEDURE — 99204 OFFICE O/P NEW MOD 45 MIN: CPT | Performed by: INTERNAL MEDICINE

## 2022-04-13 PROCEDURE — 93000 ELECTROCARDIOGRAM COMPLETE: CPT | Performed by: INTERNAL MEDICINE

## 2022-04-13 RX ORDER — CETIRIZINE HYDROCHLORIDE 10 MG/1
10 TABLET ORAL DAILY
COMMUNITY
Start: 2022-03-09

## 2022-04-13 RX ORDER — FLUTICASONE PROPIONATE 50 MCG
SPRAY, SUSPENSION (ML) NASAL
COMMUNITY
Start: 2022-03-09

## 2022-04-13 RX ORDER — LORAZEPAM 2 MG/1
TABLET ORAL
COMMUNITY
Start: 2022-03-09

## 2022-04-13 NOTE — PROGRESS NOTES
"      CARDIOLOGY    Prakash Stewart MD    ENCOUNTER DATE:  04/13/2022    Julius Gonzales Jr. / 42 y.o. / male        CHIEF COMPLAINT / REASON FOR OFFICE VISIT     Re-establish care and uncontrolled hypertension      HISTORY OF PRESENT ILLNESS       HPI  Julius Gonzales Jr. is a 42 y.o. male who presents today for consultation.  I have not seen the patient since 2018.  Patient had high blood pressure then and we found a medical regimen for him that actually worked well.  Recently however his blood pressure has skyrocketed.  He said that it was running 160-180 systolic over 100-110.  Patient has a known history to lisinopril.  His primary care physician tried him on valsartan/hydrochlorothiazide, propanolol, and amlodipine.  Patient said his blood pressure really was not controlled.  Several combinations of these medicines were tried.  His blood pressure is actually great today he said that on his own he used the combination of valsartan/hydrochlorothiazide as well as amlodipine which has really dropped his blood pressure significantly and he is feeling better from that aspect.  However he says about 2:00 in the afternoon he is absolutely washed out.      The following portions of the patient's history were reviewed and updated as appropriate: allergies, current medications, past family history, past medical history, past social history, past surgical history and problem list.      VITAL SIGNS     Visit Vitals  /78 (BP Location: Left arm)   Pulse 92   Ht 177.8 cm (70\")   Wt 88.9 kg (196 lb)   BMI 28.12 kg/m²         Wt Readings from Last 3 Encounters:   04/13/22 88.9 kg (196 lb)   02/25/22 90.6 kg (199 lb 11.8 oz)   03/03/20 89.4 kg (197 lb)     Body mass index is 28.12 kg/m².      REVIEW OF SYSTEMS   Review of Systems   Respiratory: Positive for cough.    All other systems reviewed and are negative.          PHYSICAL EXAMINATION     Vitals reviewed.   Constitutional:       Appearance: Healthy appearance. "   Pulmonary:      Effort: Pulmonary effort is normal.   Cardiovascular:      Normal rate. Regular rhythm. Normal S1. Normal S2.      Murmurs: There is no murmur.      No gallop. No click. No rub.   Pulses:     Intact distal pulses.   Edema:     Peripheral edema absent.   Neurological:      Mental Status: Alert and oriented to person, place and time.           REVIEWED DATA       ECG 12 Lead    Date/Time: 4/13/2022 11:26 AM  Performed by: Prakash Stewart MD  Authorized by: Prakash Stewart MD   Comparison: compared with previous ECG from 3/17/2019  Comparison to previous ECG: LVH is worse poor R wave progression is more pronounced  Rhythm: sinus rhythm  Other findings: left ventricular hypertrophy    Clinical impression: non-specific ECG            Cardiac Procedures:  1.           ASSESSMENT & PLAN      Diagnosis Plan   1. Primary hypertension  Adult Stress Echo W/ Cont or Stress Agent if Necessary Per Protocol   2. Chest pain, atypical  Adult Stress Echo W/ Cont or Stress Agent if Necessary Per Protocol         SUMMARY/DISCUSSION  1. Hypertension.  Patient has found a regimen that is not working well for him which I agree with it seems like it is good.  He was concerned that maybe the drop in blood pressure is what has been washing him out.  That is a distinct possibility and for the time being I told him to take one of his pills in the morning and 1 in the evening to see if we can get him to readjust to a lower pressure.  2. Patient has been having some tightness in his chest although this could easily be attributed to his blood pressure his ECG is slightly different.  He has more voltage but it is hard to call LVH and someone is under the age of 45.  With the change however I am going to get a stress echo as his ECG is not technically normal and it is slightly different than it was several years ago.  3. The stress echo was unremarkable so his blood pressure does not with current regimen and see him back  in about 8 weeks.        MEDICATIONS         Discharge Medications          Accurate as of April 13, 2022 11:23 AM. If you have any questions, ask your nurse or doctor.            Changes to Medications      Instructions Start Date   amLODIPine 5 MG tablet  Commonly known as: NORVASC  What changed: how much to take   5 mg, Oral, Daily         Continue These Medications      Instructions Start Date   cetirizine 10 MG tablet  Commonly known as: zyrTEC   10 mg, Oral, Daily      fluticasone 50 MCG/ACT nasal spray  Commonly known as: FLONASE   2 SPRAYS PER NOSTRIL PER DAY      LORazepam 2 MG tablet  Commonly known as: ATIVAN   TAKE 1/2 TO 1 TABLET BY MOUTH TWICE DAILY AS NEEDED FOR ANXIETY AND SLEEP      sildenafil 20 MG tablet  Commonly known as: REVATIO   TAKE 1-3 tablets BY MOUTH 1 hour prior TO intercourse      valsartan-hydrochlorothiazide 160-12.5 MG per tablet  Commonly known as: DIOVAN-HCT   1 tablet, Oral, Daily                 **Dragon Disclaimer:   Much of this encounter note is an electronic transcription/translation of spoken language to printed text. The electronic translation of spoken language may permit erroneous, or at times, nonsensical words or phrases to be inadvertently transcribed. Although I have reviewed the note for such errors, some may still exist.

## 2022-04-14 ENCOUNTER — HOSPITAL ENCOUNTER (OUTPATIENT)
Dept: CARDIOLOGY | Facility: HOSPITAL | Age: 42
Discharge: HOME | End: 2022-04-14
Admitting: INTERNAL MEDICINE

## 2022-04-14 VITALS
HEART RATE: 88 BPM | HEIGHT: 70 IN | BODY MASS INDEX: 28.06 KG/M2 | SYSTOLIC BLOOD PRESSURE: 140 MMHG | WEIGHT: 196 LBS | OXYGEN SATURATION: 96 % | DIASTOLIC BLOOD PRESSURE: 80 MMHG

## 2022-04-14 DIAGNOSIS — I10 PRIMARY HYPERTENSION: ICD-10-CM

## 2022-04-14 DIAGNOSIS — R07.89 CHEST PAIN, ATYPICAL: ICD-10-CM

## 2022-04-14 LAB
AORTIC ARCH: 2.1 CM
ASCENDING AORTA: 3.1 CM
BH CV ECHO MEAS - ACS: 2.3 CM
BH CV ECHO MEAS - AO MAX PG: 6.1 MMHG
BH CV ECHO MEAS - AO MEAN PG: 3.6 MMHG
BH CV ECHO MEAS - AO ROOT DIAM: 3.6 CM
BH CV ECHO MEAS - AO V2 MAX: 123.5 CM/SEC
BH CV ECHO MEAS - AO V2 VTI: 21.2 CM
BH CV ECHO MEAS - AVA(I,D): 3 CM2
BH CV ECHO MEAS - EDV(CUBED): 53.2 ML
BH CV ECHO MEAS - EDV(MOD-SP2): 41 ML
BH CV ECHO MEAS - EDV(MOD-SP4): 37 ML
BH CV ECHO MEAS - EF(MOD-BP): 56 %
BH CV ECHO MEAS - EF(MOD-SP2): 75.6 %
BH CV ECHO MEAS - EF(MOD-SP4): 67.6 %
BH CV ECHO MEAS - ESV(CUBED): 22.4 ML
BH CV ECHO MEAS - ESV(MOD-SP2): 10 ML
BH CV ECHO MEAS - ESV(MOD-SP4): 12 ML
BH CV ECHO MEAS - FS: 25 %
BH CV ECHO MEAS - IVS/LVPW: 1.05 CM
BH CV ECHO MEAS - IVSD: 1.23 CM
BH CV ECHO MEAS - LAT PEAK E' VEL: 13.3 CM/SEC
BH CV ECHO MEAS - LV DIASTOLIC VOL/BSA (35-75): 17.9 CM2
BH CV ECHO MEAS - LV MASS(C)D: 151.6 GRAMS
BH CV ECHO MEAS - LV MAX PG: 4.2 MMHG
BH CV ECHO MEAS - LV MEAN PG: 2.7 MMHG
BH CV ECHO MEAS - LV SYSTOLIC VOL/BSA (12-30): 5.8 CM2
BH CV ECHO MEAS - LV V1 MAX: 102 CM/SEC
BH CV ECHO MEAS - LV V1 VTI: 17.5 CM
BH CV ECHO MEAS - LVIDD: 3.8 CM
BH CV ECHO MEAS - LVIDS: 2.8 CM
BH CV ECHO MEAS - LVOT AREA: 3.6 CM2
BH CV ECHO MEAS - LVOT DIAM: 2.15 CM
BH CV ECHO MEAS - LVPWD: 1.18 CM
BH CV ECHO MEAS - MED PEAK E' VEL: 8.5 CM/SEC
BH CV ECHO MEAS - MV A DUR: 0.1 SEC
BH CV ECHO MEAS - MV A MAX VEL: 68.1 CM/SEC
BH CV ECHO MEAS - MV DEC SLOPE: 435.2 CM/SEC2
BH CV ECHO MEAS - MV DEC TIME: 0.22 MSEC
BH CV ECHO MEAS - MV E MAX VEL: 61.7 CM/SEC
BH CV ECHO MEAS - MV E/A: 0.91
BH CV ECHO MEAS - MV MAX PG: 2.9 MMHG
BH CV ECHO MEAS - MV MEAN PG: 1.76 MMHG
BH CV ECHO MEAS - MV V2 VTI: 20.3 CM
BH CV ECHO MEAS - MVA(VTI): 3.1 CM2
BH CV ECHO MEAS - PA ACC TIME: 0.08 SEC
BH CV ECHO MEAS - PA PR(ACCEL): 41 MMHG
BH CV ECHO MEAS - PA V2 MAX: 93.7 CM/SEC
BH CV ECHO MEAS - PULM A REVS DUR: 0.1 SEC
BH CV ECHO MEAS - PULM A REVS VEL: 51.4 CM/SEC
BH CV ECHO MEAS - PULM DIAS VEL: 35.7 CM/SEC
BH CV ECHO MEAS - PULM SYS VEL: 62.8 CM/SEC
BH CV ECHO MEAS - RV MAX PG: 3 MMHG
BH CV ECHO MEAS - RV V1 MAX: 87 CM/SEC
BH CV ECHO MEAS - RV V1 VTI: 17.8 CM
BH CV ECHO MEAS - RVOT DIAM: 2.11 CM
BH CV ECHO MEAS - SI(MOD-SP2): 15 ML/M2
BH CV ECHO MEAS - SI(MOD-SP4): 12.1 ML/M2
BH CV ECHO MEAS - SV(LVOT): 63.5 ML
BH CV ECHO MEAS - SV(MOD-SP2): 31 ML
BH CV ECHO MEAS - SV(MOD-SP4): 25 ML
BH CV ECHO MEAS - SV(RVOT): 62.2 ML
BH CV ECHO MEAS - TAPSE (>1.6): 1.74 CM
BH CV ECHO MEAS - TR MAX PG: 10.4 MMHG
BH CV ECHO MEAS - TR MAX VEL: 161.5 CM/SEC
BH CV ECHO MEASUREMENTS AVERAGE E/E' RATIO: 5.66
BH CV STRESS BP STAGE 1: NORMAL
BH CV STRESS BP STAGE 2: NORMAL
BH CV STRESS BP STAGE 3: NORMAL
BH CV STRESS DURATION MIN STAGE 1: 3
BH CV STRESS DURATION MIN STAGE 2: 3
BH CV STRESS DURATION MIN STAGE 3: 3
BH CV STRESS DURATION MIN STAGE 4: 1
BH CV STRESS DURATION SEC STAGE 1: 0
BH CV STRESS DURATION SEC STAGE 2: 0
BH CV STRESS DURATION SEC STAGE 3: 0
BH CV STRESS DURATION SEC STAGE 4: 0
BH CV STRESS ECHO POST STRESS EJECTION FRACTION EF: 73 %
BH CV STRESS GRADE STAGE 1: 10
BH CV STRESS GRADE STAGE 2: 12
BH CV STRESS GRADE STAGE 3: 14
BH CV STRESS GRADE STAGE 4: 16
BH CV STRESS HR STAGE 1: 102
BH CV STRESS HR STAGE 2: 121
BH CV STRESS HR STAGE 3: 148
BH CV STRESS HR STAGE 4: 164
BH CV STRESS METS STAGE 1: 5
BH CV STRESS METS STAGE 2: 7.5
BH CV STRESS METS STAGE 3: 10
BH CV STRESS METS STAGE 4: 13.5
BH CV STRESS PROTOCOL 1: NORMAL
BH CV STRESS SPEED STAGE 1: 1.7
BH CV STRESS SPEED STAGE 2: 2.5
BH CV STRESS SPEED STAGE 3: 3.4
BH CV STRESS SPEED STAGE 4: 4.2
BH CV STRESS STAGE 1: 1
BH CV STRESS STAGE 2: 2
BH CV STRESS STAGE 3: 3
BH CV STRESS STAGE 4: 4
BH CV XLRA - RV BASE: 2.7 CM
BH CV XLRA - RV LENGTH: 8.1 CM
BH CV XLRA - RV MID: 2.6 CM
BH CV XLRA - TDI S': 14.3 CM/SEC
LEFT ATRIUM VOLUME INDEX: 11.3 ML/M2
MAXIMAL PREDICTED HEART RATE: 176 BPM
PERCENT MAX PREDICTED HR: 93.18 %
SINUS: 3.3 CM
STJ: 2.9 CM
STRESS BASELINE BP: NORMAL MMHG
STRESS BASELINE HR: 88 BPM
STRESS PERCENT HR: 110 %
STRESS POST ESTIMATED WORKLOAD: 11 METS
STRESS POST EXERCISE DUR MIN: 10 MIN
STRESS POST EXERCISE DUR SEC: 0 SEC
STRESS POST PEAK BP: NORMAL MMHG
STRESS POST PEAK HR: 164 BPM
STRESS TARGET HR: 150 BPM

## 2022-04-14 PROCEDURE — 93018 CV STRESS TEST I&R ONLY: CPT | Performed by: INTERNAL MEDICINE

## 2022-04-14 PROCEDURE — 93017 CV STRESS TEST TRACING ONLY: CPT

## 2022-04-14 PROCEDURE — 93325 DOPPLER ECHO COLOR FLOW MAPG: CPT | Performed by: INTERNAL MEDICINE

## 2022-04-14 PROCEDURE — 93350 STRESS TTE ONLY: CPT

## 2022-04-14 PROCEDURE — 93016 CV STRESS TEST SUPVJ ONLY: CPT | Performed by: INTERNAL MEDICINE

## 2022-04-14 PROCEDURE — 93352 ADMIN ECG CONTRAST AGENT: CPT | Performed by: INTERNAL MEDICINE

## 2022-04-14 PROCEDURE — 93320 DOPPLER ECHO COMPLETE: CPT | Performed by: INTERNAL MEDICINE

## 2022-04-14 PROCEDURE — 25010000002 PERFLUTREN (DEFINITY) 8.476 MG IN SODIUM CHLORIDE (PF) 0.9 % 10 ML INJECTION: Performed by: INTERNAL MEDICINE

## 2022-04-14 PROCEDURE — 93325 DOPPLER ECHO COLOR FLOW MAPG: CPT

## 2022-04-14 PROCEDURE — 93320 DOPPLER ECHO COMPLETE: CPT

## 2022-04-14 PROCEDURE — 93350 STRESS TTE ONLY: CPT | Performed by: INTERNAL MEDICINE

## 2022-04-14 RX ADMIN — PERFLUTREN 3 ML: 6.52 INJECTION, SUSPENSION INTRAVENOUS at 13:26

## 2022-06-22 ENCOUNTER — LAB REQUISITION (OUTPATIENT)
Dept: LAB | Facility: HOSPITAL | Age: 42
End: 2022-06-22

## 2022-06-22 DIAGNOSIS — H60.61 UNSPECIFIED CHRONIC OTITIS EXTERNA, RIGHT EAR: ICD-10-CM

## 2022-06-22 PROCEDURE — 87070 CULTURE OTHR SPECIMN AEROBIC: CPT | Performed by: OTOLARYNGOLOGY

## 2022-06-22 PROCEDURE — 87205 SMEAR GRAM STAIN: CPT | Performed by: OTOLARYNGOLOGY

## 2022-06-22 PROCEDURE — 87186 SC STD MICRODIL/AGAR DIL: CPT | Performed by: OTOLARYNGOLOGY

## 2022-06-22 PROCEDURE — 87102 FUNGUS ISOLATION CULTURE: CPT | Performed by: OTOLARYNGOLOGY

## 2022-06-22 PROCEDURE — 87075 CULTR BACTERIA EXCEPT BLOOD: CPT | Performed by: OTOLARYNGOLOGY

## 2022-06-25 LAB
BACTERIA SPEC AEROBE CULT: ABNORMAL
BACTERIA SPEC AEROBE CULT: ABNORMAL
GRAM STN SPEC: ABNORMAL
GRAM STN SPEC: ABNORMAL

## 2022-06-27 LAB — BACTERIA SPEC ANAEROBE CULT: NORMAL

## 2022-07-20 LAB — FUNGUS WND CULT: NORMAL

## 2022-09-16 DIAGNOSIS — I10 PRIMARY HYPERTENSION: Primary | ICD-10-CM

## 2022-09-16 RX ORDER — SPIRONOLACTONE 25 MG/1
25 TABLET ORAL DAILY
Qty: 30 TABLET | Refills: 11 | Status: SHIPPED | OUTPATIENT
Start: 2022-09-16

## 2023-11-21 ENCOUNTER — OFFICE (OUTPATIENT)
Dept: URBAN - METROPOLITAN AREA CLINIC 76 | Facility: CLINIC | Age: 43
End: 2023-11-21

## 2023-11-21 VITALS
HEIGHT: 70 IN | HEART RATE: 93 BPM | OXYGEN SATURATION: 96 % | SYSTOLIC BLOOD PRESSURE: 113 MMHG | DIASTOLIC BLOOD PRESSURE: 73 MMHG | WEIGHT: 217 LBS

## 2023-11-21 DIAGNOSIS — K21.9 GASTRO-ESOPHAGEAL REFLUX DISEASE WITHOUT ESOPHAGITIS: ICD-10-CM

## 2023-11-21 DIAGNOSIS — K62.89 OTHER SPECIFIED DISEASES OF ANUS AND RECTUM: ICD-10-CM

## 2023-11-21 PROCEDURE — 99204 OFFICE O/P NEW MOD 45 MIN: CPT | Performed by: INTERNAL MEDICINE

## 2023-11-21 RX ORDER — HYDROCORTISONE 25 MG/G
OINTMENT TOPICAL
Qty: 30 | Refills: 3 | Status: ACTIVE
Start: 2023-11-21

## 2023-11-21 RX ORDER — HYDROCORTISONE ACETATE 25 MG/1
SUPPOSITORY RECTAL
Qty: 21 | Refills: 2 | Status: ACTIVE
Start: 2023-11-21

## 2024-05-13 ENCOUNTER — OFFICE VISIT (OUTPATIENT)
Dept: SURGERY | Facility: CLINIC | Age: 44
End: 2024-05-13
Payer: COMMERCIAL

## 2024-05-13 VITALS
BODY MASS INDEX: 29.31 KG/M2 | SYSTOLIC BLOOD PRESSURE: 108 MMHG | DIASTOLIC BLOOD PRESSURE: 68 MMHG | WEIGHT: 204.7 LBS | OXYGEN SATURATION: 97 % | HEIGHT: 70 IN | HEART RATE: 69 BPM

## 2024-05-13 DIAGNOSIS — Z12.11 COLON CANCER SCREENING: ICD-10-CM

## 2024-05-13 DIAGNOSIS — K60.2 FISSURE, ANAL: Primary | ICD-10-CM

## 2024-05-13 PROCEDURE — 99204 OFFICE O/P NEW MOD 45 MIN: CPT | Performed by: COLON & RECTAL SURGERY

## 2024-05-13 RX ORDER — TADALAFIL 5 MG/1
1 TABLET ORAL DAILY
COMMUNITY
Start: 2024-03-13

## 2024-05-13 RX ORDER — OLMESARTAN MEDOXOMIL 20 MG/1
1 TABLET ORAL DAILY
COMMUNITY
Start: 2024-03-13

## 2024-05-13 RX ORDER — AMLODIPINE BESYLATE 2.5 MG/1
1 TABLET ORAL DAILY
COMMUNITY
Start: 2024-03-13

## 2024-05-13 RX ORDER — HYDROCHLOROTHIAZIDE 12.5 MG/1
1 CAPSULE, GELATIN COATED ORAL DAILY
COMMUNITY
Start: 2024-03-13

## 2024-05-13 NOTE — PROGRESS NOTES
Subjective   Julius PROCTOR Janet Rodrigues. is a 44 y.o. male.     History of Present Illness     {Common H&P Review Areas:68586}    Review of Systems    Objective   Physical Exam      Assessment & Plan   {Assess/PlanSmartLinks:62739}

## 2024-05-13 NOTE — PROGRESS NOTES
Julius Gonzales Jr. is a 44 y.o. male who is seen as a consult at the request of Delio Lawler Jr., MD for Rectal Pain.      HPI:  History of Present Illness  The patient was referred over by Dr. Resendez for possible fissure. The patient was seen on 04/16/2024. He is on testosterone therapy.    Approximately 1 to 1.5 years ago, the patient experienced fatigue, prompting his primary care physician to conduct blood work. The results indicated a testosterone level of 390, prompting the initiation of testosterone therapy to enhance his energy levels. Following the initiation of testosterone therapy, the patient reported significant improvement. However, during a 10-day trip to [place] in 10/2023, he experienced constipation, necessitating multiple bowel movements, describing the stool as resembling glass. This constipation persisted until 12/2023. During a fishing trip to [place], the patient failed to self-administer testosterone, leading to a 10-day hiatus. Upon his return from [place], he experienced another episode of constipation, which he self-administered testosterone, resulting in flatulence within 3 to 4 days. This issue persisted for approximately a month, coinciding with the discontinuation of testosterone. The patient's primary care physician attributed these symptoms to his omission of testosterone injections. Subsequently, Dr. White referred him to our clinic. The patient reported nocturnal sexual arousal during testosterone therapy, which he believes may have inflamed his lower pelvic muscle. He consulted a gastroenterologist at [facility], who performed a stool test and found no presence of blood. The patient was prescribed suppositories and a high-grade antibiotic. Dr. Hunt initiated a compound medication due to weight gain, which resulted in a weight loss of 18 pounds and an increase in energy levels. However, his blood pressure was recorded as 160/100 in 11/2023. The patient occasionally  "takes stool softeners and has observed that heavy lifting exacerbates his condition.       Past Medical History:   Diagnosis Date    Back pain     Chest pain     GERD (gastroesophageal reflux disease)     Hypertension     Rash        Past Surgical History:   Procedure Laterality Date    HERNIA REPAIR         Social History:   reports that he has never smoked. His smokeless tobacco use includes chew. He reports current alcohol use. He reports that he does not use drugs.      Marriage status:     Family History   Problem Relation Age of Onset    Hypertension Mother     Hypertension Father     Stroke Sister     Diabetes Paternal Uncle     Heart attack Maternal Grandmother     Stroke Maternal Grandmother     Hypertension Maternal Grandmother     Diabetes Maternal Grandmother     Hypertension Maternal Grandfather     Heart disease Paternal Grandmother     Heart attack Paternal Grandmother     Hypertension Paternal Grandmother     Diabetes Paternal Grandmother     Sudden death Paternal Grandmother 58        \"heart explosion\"    Hypertension Paternal Grandfather     Aneurysm Paternal Grandfather          Current Outpatient Medications:     amLODIPine (NORVASC) 2.5 MG tablet, Take 1 tablet by mouth Daily., Disp: , Rfl:     hydroCHLOROthiazide (MICROZIDE) 12.5 MG capsule, Take 1 capsule by mouth Daily., Disp: , Rfl:     LORazepam (ATIVAN) 2 MG tablet, TAKE 1/2 TO 1 TABLET BY MOUTH TWICE DAILY AS NEEDED FOR ANXIETY AND SLEEP, Disp: , Rfl:     olmesartan (BENICAR) 20 MG tablet, Take 1 tablet by mouth Daily., Disp: , Rfl:     tadalafil (CIALIS) 5 MG tablet, Take 1 tablet by mouth Daily., Disp: , Rfl:     Allergy  Benazepril, Bystolic [nebivolol hcl], Ibuprofen, and Lisinopril      Vitals:    05/13/24 0846   BP: 108/68   Pulse: 69   SpO2: 97%     Body mass index is 29.37 kg/m².      Physical Exam    Physical Exam  Exam conducted with a chaperone present.   Constitutional:       General: He is not in acute distress.     " Appearance: He is well-developed.   HENT:      Head: Normocephalic and atraumatic.      Nose: Nose normal.   Eyes:      Conjunctiva/sclera: Conjunctivae normal.      Pupils: Pupils are equal, round, and reactive to light.   Neck:      Trachea: No tracheal deviation.   Pulmonary:      Effort: Pulmonary effort is normal. No respiratory distress.      Breath sounds: Normal breath sounds.   Abdominal:      General: Bowel sounds are normal. There is no distension.      Palpations: Abdomen is soft.   Genitourinary:     Comments: During the perianal exam, externally minor tags were observed but excoriation circumferentially.     Posteriorly, a healing fissure was observed.  Musculoskeletal:         General: No deformity. Normal range of motion.      Cervical back: Normal range of motion.   Skin:     General: Skin is warm and dry.   Neurological:      Mental Status: He is alert and oriented to person, place, and time.      Cranial Nerves: No cranial nerve deficit.      Coordination: Coordination normal.      Gait: Gait normal.   Psychiatric:         Behavior: Behavior normal.         Judgment: Judgment normal.         Review of Medical Record:  I reviewed medical records as detailed in HPI.     Assessment & Plan  1. Anal fissure.  The patient was informed that the majority of anal fissures can be managed with compound medication but at this point he is not having pain.  I do not think he needs the compound at this time.. A comprehensive discussion was held regarding the nature of anal fissures, emphasizing that the healing process is typically self-resolving. However, if the patient continues to experience issues despite dietary modifications, the objective is to prevent further episodes. The patient's constipation appears to be triggering the anal fissures, necessitating preventive measures such as daily stool softeners. The use of Colace was deemed beneficial, although it may cause loose stools. The patient was also  advised to incorporate a fiber supplement such as Metamucil, Citrucel, or FiberCon into the patient's diet, starting with a full dose of 30 to 40 g, and using a stool softener as needed. Calmoseptine was recommended for severe itching, along with A and D or Desitin for daily skin protection.   For colon cancer screening I recommend a colonoscopy.  I would recommend he get this closer to home in [place].  I will send a referral to gastroenterology.     1. Fissure, anal    2. Colon cancer screening          Patient or patient representative verbalized consent for the use of Ambient Listening during the visit with  Edis Alexandre MD for chart documentation. 5/13/2024  16:54 EDT

## 2024-05-16 ENCOUNTER — PATIENT ROUNDING (BHMG ONLY) (OUTPATIENT)
Dept: SURGERY | Facility: CLINIC | Age: 44
End: 2024-05-16
Payer: COMMERCIAL

## 2024-05-16 NOTE — PROGRESS NOTES
May 16, 2024    Hello, may I speak with Julius Gonzales Jr.?    My name is Indra      I am  with [facility]  [address] [facility] [address]  [facility] [address] [phone].    Before we get started may I verify your date of birth? 1980    I am calling to officially welcome you to our practice and ask about your recent visit. Is this a good time to talk? yes    Tell me about your visit with us. What things went well?  everything went well        We're always looking for ways to make our patients' experiences even better. Do you have recommendations on ways we may improve?  no    Overall were you satisfied with your first visit to our practice? yes       I appreciate you taking the time to speak with me today. Is there anything else I can do for you? no      Thank you, and have a great day.

## 2024-06-06 ENCOUNTER — CLINICAL SUPPORT (OUTPATIENT)
Dept: GASTROENTEROLOGY | Facility: CLINIC | Age: 44
End: 2024-06-06
Payer: COMMERCIAL

## 2024-06-06 ENCOUNTER — PREP FOR SURGERY (OUTPATIENT)
Dept: OTHER | Facility: HOSPITAL | Age: 44
End: 2024-06-06
Payer: COMMERCIAL

## 2024-06-06 DIAGNOSIS — Z12.11 SCREENING FOR MALIGNANT NEOPLASM OF COLON: Primary | ICD-10-CM

## 2024-06-06 NOTE — PROGRESS NOTES
"Julius Gonzales Jr.  1980  44 y.o.    Reason for call: Screening Colonoscopy    Prep prescribed: Imelda - Patient reports issues with constipation  Prep instructions reviewed with patient and sent to patient via regular mail to the home address on file    Clearance needed? No    Is the patient currently on any injectable medications for weight loss or diabetes? Yes      Family history of colon cancer? No  If yes, indicate relative:     The patient has been scheduled for: Colonoscopy  Procedure Date: 8/21/24  Family History   Problem Relation Age of Onset    Hypertension Mother     Hypertension Father     Stroke Sister     Diabetes Paternal Uncle     Heart attack Maternal Grandmother     Stroke Maternal Grandmother     Hypertension Maternal Grandmother     Diabetes Maternal Grandmother     Hypertension Maternal Grandfather     Heart disease Paternal Grandmother     Heart attack Paternal Grandmother     Hypertension Paternal Grandmother     Diabetes Paternal Grandmother     Sudden death Paternal Grandmother 58        \"heart explosion\"    Hypertension Paternal Grandfather     Aneurysm Paternal Grandfather     Colon cancer Neg Hx      Past Medical History:   Diagnosis Date    Back pain     Chest pain     GERD (gastroesophageal reflux disease)     Hypertension     Rash      Allergies   Allergen Reactions    Benazepril     Bystolic [Nebivolol Hcl]     Ibuprofen Angioedema    Lisinopril      Past Surgical History:   Procedure Laterality Date    HERNIA REPAIR       Social History     Socioeconomic History    Marital status:    Tobacco Use    Smoking status: Never    Smokeless tobacco: Current     Types: Chew    Tobacco comments:     no caffeine   Vaping Use    Vaping status: Never Used   Substance and Sexual Activity    Alcohol use: Yes     Comment: seldom    Drug use: Never    Sexual activity: Yes     Partners: Male     Birth control/protection: None       Current Outpatient Medications:     LORazepam (ATIVAN) 2 " MG tablet, TAKE 1/2 TO 1 TABLET BY MOUTH TWICE DAILY AS NEEDED FOR ANXIETY AND SLEEP, Disp: , Rfl:     tadalafil (CIALIS) 5 MG tablet, Take 1 tablet by mouth Daily., Disp: , Rfl:     Tirzepatide (MOUNJARO) 2.5 MG/0.5ML solution pen-injector pen, Inject 0.5 mL under the skin into the appropriate area as directed 1 (One) Time Per Week., Disp: , Rfl:     amLODIPine (NORVASC) 2.5 MG tablet, Take 1 tablet by mouth Daily. (Patient not taking: Reported on 6/6/2024), Disp: , Rfl:     hydroCHLOROthiazide (MICROZIDE) 12.5 MG capsule, Take 1 capsule by mouth Daily. (Patient not taking: Reported on 6/6/2024), Disp: , Rfl:     olmesartan (BENICAR) 20 MG tablet, Take 1 tablet by mouth Daily. (Patient not taking: Reported on 6/6/2024), Disp: , Rfl:

## 2024-08-19 ENCOUNTER — TELEPHONE (OUTPATIENT)
Dept: GASTROENTEROLOGY | Facility: CLINIC | Age: 44
End: 2024-08-19
Payer: COMMERCIAL

## 2024-08-19 NOTE — TELEPHONE ENCOUNTER
Julius Gonzales Jr.  1980    Patient requested to Reschedule their Colonoscopy. I have offered to reschedule this patient and patient has agreed. Patient has been rescheduled to 10.17.24 @ 8:30 am.    Reason for cancelling/rescheduling: Covid +    This procedure was ordered by Jerod Batista MD for an important reason. We want to inform you that there are risks associated with not proceeding with the procedure at this time such as a delay in diagnosis, risk of incurable disease, or cancer.    Updated clearance needed?: No    Is the patient currently on any injectable medications for weight loss or diabetes? Yes  If yes, are they aware that they will need to discontinue this 7 days prior to their procedure? Yes    Patient verbalized understanding for all of the above information.

## 2024-09-05 ENCOUNTER — TELEMEDICINE (OUTPATIENT)
Dept: FAMILY MEDICINE CLINIC | Facility: TELEHEALTH | Age: 44
End: 2024-09-05
Payer: COMMERCIAL

## 2024-09-05 DIAGNOSIS — J32.9 BACTERIAL SINUSITIS: Primary | ICD-10-CM

## 2024-09-05 DIAGNOSIS — B96.89 BACTERIAL SINUSITIS: Primary | ICD-10-CM

## 2024-09-05 RX ORDER — METHYLPREDNISOLONE 4 MG
TABLET, DOSE PACK ORAL
Qty: 1 EACH | Refills: 0 | Status: SHIPPED | OUTPATIENT
Start: 2024-09-05

## 2024-09-05 RX ORDER — DEXTROMETHORPHAN HYDROBROMIDE AND PROMETHAZINE HYDROCHLORIDE 15; 6.25 MG/5ML; MG/5ML
5 SYRUP ORAL 4 TIMES DAILY PRN
Qty: 120 ML | Refills: 0 | Status: SHIPPED | OUTPATIENT
Start: 2024-09-05 | End: 2024-09-15

## 2024-09-05 NOTE — PATIENT INSTRUCTIONS
Sinus Infection, Adult  A sinus infection is soreness and swelling (inflammation) of your sinuses. Sinuses are hollow spaces in the bones around your face. They are located:  Around your eyes.  In the middle of your forehead.  Behind your nose.  In your cheekbones.  Your sinuses and nasal passages are lined with a fluid called mucus. Mucus drains out of your sinuses. Swelling can trap mucus in your sinuses. This lets germs (bacteria, virus, or fungus) grow, which leads to infection. Most of the time, this condition is caused by a virus.  What are the causes?  Allergies.  Asthma.  Germs.  Things that block your nose or sinuses.  Growths in the nose (nasal polyps).  Chemicals or irritants in the air.  A fungus. This is rare.  What increases the risk?  Having a weak body defense system (immune system).  Doing a lot of swimming or diving.  Using nasal sprays too much.  Smoking.  What are the signs or symptoms?  The main symptoms of this condition are pain and a feeling of pressure around the sinuses. Other symptoms include:  Stuffy nose (congestion). This may make it hard to breathe through your nose.  Runny nose (drainage).  Soreness, swelling, and warmth in the sinuses.  A cough that may get worse at night.  Being unable to smell and taste.  Mucus that collects in the throat or the back of the nose (postnasal drip). This may cause a sore throat or bad breath.  Being very tired (fatigued).  A fever.  How is this diagnosed?  Your symptoms.  Your medical history.  A physical exam.  Tests to find out if your condition is short-term (acute) or long-term (chronic). Your doctor may:  Check your nose for growths (polyps).  Check your sinuses using a tool that has a light on one end (endoscope).  Check for allergies or germs.  Do imaging tests, such as an MRI or CT scan.  How is this treated?  Treatment for this condition depends on the cause and whether it is short-term or long-term.  If caused by a virus, your symptoms  should go away on their own within 10 days. You may be given medicines to relieve symptoms. They include:  Medicines that shrink swollen tissue in the nose.  A spray that treats swelling of the nostrils.  Rinses that help get rid of thick mucus in your nose (nasal saline washes).  Medicines that treat allergies (antihistamines).  Over-the-counter pain relievers.  If caused by bacteria, your doctor may wait to see if you will get better without treatment. You may be given antibiotic medicine if you have:  A very bad infection.  A weak body defense system.  If caused by growths in the nose, surgery may be needed.  Follow these instructions at home:  Medicines  Take, use, or apply over-the-counter and prescription medicines only as told by your doctor. These may include nasal sprays.  If you were prescribed an antibiotic medicine, take it as told by your doctor. Do not stop taking it even if you start to feel better.  Hydrate and humidify    Drink enough water to keep your pee (urine) pale yellow.  Use a cool mist humidifier to keep the humidity level in your home above 50%.  Breathe in steam for 10-15 minutes, 3-4 times a day, or as told by your doctor. You can do this in the bathroom while a hot shower is running.  Try not to spend time in cool or dry air.  Rest  Rest as much as you can.  Sleep with your head raised (elevated).  Make sure you get enough sleep each night.  General instructions    Put a warm, moist washcloth on your face 3-4 times a day, or as often as told by your doctor.  Use nasal saline washes as often as told by your doctor.  Wash your hands often with soap and water. If you cannot use soap and water, use hand .  Do not smoke. Avoid being around people who are smoking (secondhand smoke).  Keep all follow-up visits.  Contact a doctor if:  You have a fever.  Your symptoms get worse.  Your symptoms do not get better within 10 days.  Get help right away if:  You have a very bad headache.  You  cannot stop vomiting.  You have very bad pain or swelling around your face or eyes.  You have trouble seeing.  You feel confused.  Your neck is stiff.  You have trouble breathing.  These symptoms may be an emergency. Get help right away. Call 911.  Do not wait to see if the symptoms will go away.  Do not drive yourself to the hospital.  Summary  A sinus infection is swelling of your sinuses. Sinuses are hollow spaces in the bones around your face.  This condition is caused by tissues in your nose that become inflamed or swollen. This traps germs. These can lead to infection.  If you were prescribed an antibiotic medicine, take it as told by your doctor. Do not stop taking it even if you start to feel better.  Keep all follow-up visits.  This information is not intended to replace advice given to you by your health care provider. Make sure you discuss any questions you have with your health care provider.  Document Revised: 11/22/2022 Document Reviewed: 11/22/2022  [employer] Patient Education © 2024 [employer]

## 2024-09-05 NOTE — PROGRESS NOTES
Chief Complaint   Patient presents with    Sinusitis       Video Visit Reason:   Free Text Description: I have serious congestion that I am not being able to get broke up by over the counter drugs  Subjective   Julius Gonzales Jr. is a 44 y.o. male.     History of Present Illness  Sinus congestion, sinus pressure, facial pressure and pain, postnasal drainage that has caused increasing cough over the last several days. He has tried several OTC medications but nothing is helping. Over the last several nights, he has coughed all night.   Sinusitis  This is a new problem. The problem has been gradually worsening since onset. There has been no fever. Associated symptoms include congestion, coughing, headaches, a hoarse voice and sinus pressure. Pertinent negatives include no chills or shortness of breath. Past treatments include oral decongestants. The treatment provided moderate relief.       The following portions of the patient's history were reviewed and updated as appropriate: allergies, current medications, past medical history, and problem list.      Past Medical History:   Diagnosis Date    Back pain     Chest pain     GERD (gastroesophageal reflux disease)     Hypertension     Rash      Social History     Socioeconomic History    Marital status:    Tobacco Use    Smoking status: Never    Smokeless tobacco: Current     Types: Chew    Tobacco comments:     no caffeine   Vaping Use    Vaping status: Never Used   Substance and Sexual Activity    Alcohol use: Yes     Comment: seldom    Drug use: Never    Sexual activity: Yes     Partners: Male     Birth control/protection: None     medication documentation: reviewed and updated with patient and   Current Outpatient Medications:     LORazepam (ATIVAN) 2 MG tablet, TAKE 1/2 TO 1 TABLET BY MOUTH TWICE DAILY AS NEEDED FOR ANXIETY AND SLEEP, Disp: , Rfl:     polyethylene glycol (GoLYTELY) 236 g solution, Take per office instructions, Disp: 4000 mL, Rfl: 0     tadalafil (CIALIS) 5 MG tablet, Take 1 tablet by mouth Daily., Disp: , Rfl:     Tirzepatide (MOUNJARO) 2.5 MG/0.5ML solution pen-injector pen, Inject 0.5 mL under the skin into the appropriate area as directed 1 (One) Time Per Week., Disp: , Rfl:     amoxicillin-clavulanate (AUGMENTIN) 875-125 MG per tablet, Take 1 tablet by mouth 2 (Two) Times a Day for 7 days., Disp: 14 tablet, Rfl: 0    methylPREDNISolone (MEDROL) 4 MG dose pack, Take as directed on package instructions., Disp: 1 each, Rfl: 0    promethazine-dextromethorphan (PROMETHAZINE-DM) 6.25-15 MG/5ML syrup, Take 5 mL by mouth 4 (Four) Times a Day As Needed for Cough for up to 10 days., Disp: 120 mL, Rfl: 0  Review of Systems   Constitutional:  Positive for fatigue. Negative for chills and fever.   HENT:  Positive for congestion, hoarse voice, postnasal drip, sinus pressure, sinus pain and voice change.    Respiratory:  Positive for cough and chest tightness. Negative for shortness of breath.    Allergic/Immunologic: Positive for environmental allergies.   Neurological:  Positive for headaches.       Objective   Physical Exam  Constitutional:       Appearance: Normal appearance. He is well-developed.   HENT:      Nose: Congestion present.      Right Sinus: Maxillary sinus tenderness present.      Left Sinus: Maxillary sinus tenderness present.   Eyes:      Conjunctiva/sclera: Conjunctivae normal.   Pulmonary:      Effort: Pulmonary effort is normal.   Lymphadenopathy:      Head:      Right side of head: No tonsillar, preauricular or posterior auricular adenopathy.      Left side of head: No tonsillar, preauricular or posterior auricular adenopathy.      Cervical: No cervical adenopathy (patient guided exam).   Psychiatric:         Speech: Speech normal.         Assessment & Plan   Diagnoses and all orders for this visit:    1. Bacterial sinusitis (Primary)  -     amoxicillin-clavulanate (AUGMENTIN) 875-125 MG per tablet; Take 1 tablet by mouth 2 (Two) Times  a Day for 7 days.  Dispense: 14 tablet; Refill: 0  -     methylPREDNISolone (MEDROL) 4 MG dose pack; Take as directed on package instructions.  Dispense: 1 each; Refill: 0  -     promethazine-dextromethorphan (PROMETHAZINE-DM) 6.25-15 MG/5ML syrup; Take 5 mL by mouth 4 (Four) Times a Day As Needed for Cough for up to 10 days.  Dispense: 120 mL; Refill: 0                    Follow Up:  If your symptoms are not resolving by the completion of your treatment or are worsening, see your primary care provider for follow up. If you don't have a primary care provider, you may go to any Urgent Care for re-evaluation. If you develop any life threatening symptoms, go to the nearest Emergency Department immediately or call EMS.               The use of  Video Visit was utilized during this visit, using both [employer] and [employer]. The use of a video visit has been reviewed with the patient and verbal informed consent has been obtained. No technical difficulties occurred during the visit.    is located at [address] [place] [address] [address] [place] [address]  Provider is located at [place], [place]

## 2024-10-14 ENCOUNTER — TELEPHONE (OUTPATIENT)
Dept: GASTROENTEROLOGY | Facility: CLINIC | Age: 44
End: 2024-10-14
Payer: COMMERCIAL

## 2024-10-14 NOTE — TELEPHONE ENCOUNTER
Called pt. No Answer. Left message for pt to call back.    Postponing Patient Call until tomorrow, 10.15.24

## 2024-10-14 NOTE — TELEPHONE ENCOUNTER
Hub staff attempted to follow warm transfer process and was unsuccessful     Caller: marco antonio hurst    Relationship to patient: Emergency Contact    Best call back number: [phone]    Patient is needing: LINDY PUGH CALLING TO CANCEL COLONOSCOPY [facility]. PLEASE CONTACT TO CONFIRM

## 2024-10-15 NOTE — TELEPHONE ENCOUNTER
Julius Gonzales Jr.  1980    Patient requested to Cancel their Colonoscopy. I have offered to reschedule this patient and patient has declined.     Reason for cancelling/rescheduling: Sick grandfather     This procedure was ordered by Nurse/MA for an important reason. We want to inform you that there are risks associated with not proceeding with the procedure at this time such as a delay in diagnosis, risk of incurable disease, or cancer.    Patient plans to call us back to reschedule: Yes    Patient verbalized understanding for all of the above information.

## 2025-03-04 ENCOUNTER — TELEMEDICINE (OUTPATIENT)
Dept: FAMILY MEDICINE CLINIC | Facility: TELEHEALTH | Age: 45
End: 2025-03-04
Payer: COMMERCIAL

## 2025-03-04 DIAGNOSIS — B34.9 VIRAL ILLNESS: Primary | ICD-10-CM

## 2025-03-04 PROCEDURE — 99213 OFFICE O/P EST LOW 20 MIN: CPT | Performed by: NURSE PRACTITIONER

## 2025-03-04 RX ORDER — PREDNISONE 20 MG/1
20 TABLET ORAL 2 TIMES DAILY
Qty: 10 TABLET | Refills: 0 | Status: SHIPPED | OUTPATIENT
Start: 2025-03-04 | End: 2025-03-09

## 2025-03-04 RX ORDER — OLMESARTAN MEDOXOMIL 20 MG/1
TABLET ORAL
COMMUNITY
Start: 2024-12-04

## 2025-03-04 RX ORDER — HYDROCHLOROTHIAZIDE 12.5 MG/1
1 CAPSULE ORAL DAILY
COMMUNITY
Start: 2025-01-07

## 2025-03-04 NOTE — PROGRESS NOTES
"HPI  Julius Gonzales Jr. is a 45 y.o. male  presents with complaint of sinus pressure, chest congestion, chest feels like it is burning, and fatigue, tactile low-grade fever. No known exposure to covid or flu. Denies body aches, N/V/D. Has not taken any meds for symptoms. Tested negative for covid.     Review of Systems    Past Medical History:   Diagnosis Date    Back pain     Chest pain     GERD (gastroesophageal reflux disease)     Hypertension     Rash        Family History   Problem Relation Age of Onset    Hypertension Mother     Hypertension Father     Stroke Sister     Diabetes Paternal Uncle     Heart attack Maternal Grandmother     Stroke Maternal Grandmother     Hypertension Maternal Grandmother     Diabetes Maternal Grandmother     Hypertension Maternal Grandfather     Heart disease Paternal Grandmother     Heart attack Paternal Grandmother     Hypertension Paternal Grandmother     Diabetes Paternal Grandmother     Sudden death Paternal Grandmother 58        \"heart explosion\"    Hypertension Paternal Grandfather     Aneurysm Paternal Grandfather     Colon cancer Neg Hx        Social History     Socioeconomic History    Marital status:    Tobacco Use    Smoking status: Never    Smokeless tobacco: Current     Types: Chew    Tobacco comments:     no caffeine   Vaping Use    Vaping status: Never Used   Substance and Sexual Activity    Alcohol use: Yes     Comment: seldom    Drug use: Never    Sexual activity: Yes     Partners: Male     Birth control/protection: None         There were no vitals taken for this visit.    PHYSICAL EXAM  Physical Exam   Constitutional: He appears well-developed and well-nourished.   HENT:   Head: Normocephalic.   Nose: Nose normal.   Neck: Neck normal appearance.  Pulmonary/Chest: Effort normal.   Neurological: He is alert.   Psychiatric: He has a normal mood and affect. His speech is normal.       Diagnoses and all orders for this visit:    1. Viral illness (Primary)  -   "   predniSONE (DELTASONE) 20 MG tablet; Take 1 tablet by mouth 2 (Two) Times a Day for 5 days.  Dispense: 10 tablet; Refill: 0          FOLLOW-UP  As discussed during visit with [facility], if symptoms worsen or fail to improve, follow-up with PCP/Urgent Care/Emergency Department.    Patient verbalizes understanding of medications, instructions for treatment and follow-up.    Iveth Whalen, APRN  03/04/2025  17:56 EST      Mode of Visit: Video  Location of patient: -HOME-  Location of provider: Home  You have chosen to receive care through a telehealth visit.  The patient has signed the video visit consent form.  The visit included audio and video interaction. No technical issues occurred during this visit.

## 2025-07-02 ENCOUNTER — OFFICE VISIT (OUTPATIENT)
Dept: FAMILY MEDICINE CLINIC | Facility: CLINIC | Age: 45
End: 2025-07-02
Payer: COMMERCIAL

## 2025-07-02 VITALS
OXYGEN SATURATION: 98 % | SYSTOLIC BLOOD PRESSURE: 138 MMHG | BODY MASS INDEX: 29.63 KG/M2 | DIASTOLIC BLOOD PRESSURE: 82 MMHG | HEIGHT: 70 IN | WEIGHT: 207 LBS | HEART RATE: 87 BPM

## 2025-07-02 DIAGNOSIS — N40.0 BENIGN PROSTATIC HYPERPLASIA WITHOUT LOWER URINARY TRACT SYMPTOMS: ICD-10-CM

## 2025-07-02 DIAGNOSIS — Z13.220 LIPID SCREENING: ICD-10-CM

## 2025-07-02 DIAGNOSIS — Z76.89 ENCOUNTER TO ESTABLISH CARE: Primary | ICD-10-CM

## 2025-07-02 DIAGNOSIS — I10 HYPERTENSION, UNSPECIFIED TYPE: ICD-10-CM

## 2025-07-02 DIAGNOSIS — N52.9 ERECTILE DYSFUNCTION, UNSPECIFIED ERECTILE DYSFUNCTION TYPE: ICD-10-CM

## 2025-07-02 DIAGNOSIS — E34.9 TESTOSTERONE DEFICIENCY: ICD-10-CM

## 2025-07-02 DIAGNOSIS — Z12.5 PROSTATE CANCER SCREENING: ICD-10-CM

## 2025-07-02 DIAGNOSIS — Z13.29 THYROID DISORDER SCREEN: ICD-10-CM

## 2025-07-02 DIAGNOSIS — Z12.11 COLON CANCER SCREENING: ICD-10-CM

## 2025-07-02 DIAGNOSIS — E66.3 OVER WEIGHT: ICD-10-CM

## 2025-07-02 RX ORDER — TADALAFIL 5 MG/1
5 TABLET ORAL DAILY
Qty: 90 TABLET | Refills: 1 | Status: SHIPPED | OUTPATIENT
Start: 2025-07-02

## 2025-07-02 RX ORDER — SILDENAFIL 100 MG/1
100 TABLET, FILM COATED ORAL DAILY PRN
COMMUNITY

## 2025-07-02 RX ORDER — SEMAGLUTIDE 0.25 MG/.5ML
0.25 INJECTION, SOLUTION SUBCUTANEOUS WEEKLY
Qty: 2 ML | Refills: 1 | Status: SHIPPED | OUTPATIENT
Start: 2025-07-02

## 2025-07-02 RX ORDER — HYDROCHLOROTHIAZIDE 12.5 MG/1
12.5 CAPSULE ORAL DAILY
Qty: 90 CAPSULE | Refills: 1 | Status: SHIPPED | OUTPATIENT
Start: 2025-07-02

## 2025-07-02 NOTE — PROGRESS NOTES
"Chief Complaint  Establish Care, Hypertension, and Obesity    SUBJECTIVE  Julius Gonzales . presents to [facility] to establish care. Previous PCP Carol Roberts, will request records.     Pt states he was on compound Tirzepatide and lost about 26 lbs. Pt did well other than feeling depressed. Pt has stopped the Tirzepatide then the feelings went away. Pt is interested in an alternative for weight loss.     Pt states he stopped the tirzepatide about 3 weeks ago- states that he could tell that while he took the medication he was feeling very down, but it resolved immediately after stopping the medication     Pt states he last had labs about a year ago     Pt reports has hx of low testosterone and was on injections  but stopped, thinks that he is doing ok without it, states that he feels his energy level is where it should be, but would like to have his testosterone checked again     [facility], told him everything looked good    History of Present Illness  Past Medical History:   Diagnosis Date    Back pain     Chest pain     GERD (gastroesophageal reflux disease)     Hypertension     Rash       Family History   Problem Relation Age of Onset    Hypertension Mother     Hypertension Father     Stroke Sister     Diabetes Paternal Uncle     Heart attack Maternal Grandmother     Stroke Maternal Grandmother     Hypertension Maternal Grandmother     Diabetes Maternal Grandmother     Hypertension Maternal Grandfather     Heart disease Paternal Grandmother     Heart attack Paternal Grandmother     Hypertension Paternal Grandmother     Diabetes Paternal Grandmother     Sudden death Paternal Grandmother 58        \"heart explosion\"    Hypertension Paternal Grandfather     Aneurysm Paternal Grandfather     Colon cancer Neg Hx       Past Surgical History:   Procedure Laterality Date    HERNIA REPAIR          Current Outpatient Medications:     " "hydroCHLOROthiazide (MICROZIDE) 12.5 MG capsule, Take 1 capsule by mouth Daily., Disp: 90 capsule, Rfl: 1    sildenafil (VIAGRA) 100 MG tablet, Take 1 tablet by mouth Daily As Needed for Erectile Dysfunction., Disp: , Rfl:     tadalafil (CIALIS) 5 MG tablet, Take 1 tablet by mouth Daily., Disp: 90 tablet, Rfl: 1    Semaglutide-Weight Management (Wegovy) 0.25 MG/0.5ML solution auto-injector, Inject 0.5 mL under the skin into the appropriate area as directed 1 (One) Time Per Week., Disp: 2 mL, Rfl: 1    OBJECTIVE  Vital Signs:   /82   Pulse 87   Ht 177.8 cm (70\")   Wt 93.9 kg (207 lb)   SpO2 98%   BMI 29.70 kg/m²    Estimated body mass index is 29.7 kg/m² as calculated from the following:    Height as of this encounter: 177.8 cm (70\").    Weight as of this encounter: 93.9 kg (207 lb).     Wt Readings from Last 3 Encounters:   07/02/25 93.9 kg (207 lb)   05/13/24 92.9 kg (204 lb 11.2 oz)   04/14/22 88.9 kg (196 lb)     BP Readings from Last 3 Encounters:   07/02/25 138/82   05/13/24 108/68   04/14/22 140/80       Physical Exam  Vitals reviewed.   Constitutional:       Appearance: Normal appearance. He is well-developed.   HENT:      Head: Normocephalic and atraumatic.      Right Ear: External ear normal.      Left Ear: External ear normal.   Eyes:      Conjunctiva/sclera: Conjunctivae normal.      Pupils: Pupils are equal, round, and reactive to light.   Cardiovascular:      Rate and Rhythm: Normal rate and regular rhythm.      Heart sounds: No murmur heard.     No friction rub. No gallop.   Pulmonary:      Effort: Pulmonary effort is normal.      Breath sounds: Normal breath sounds. No wheezing or rhonchi.   Skin:     General: Skin is warm and dry.   Neurological:      Mental Status: He is alert and oriented to person, place, and time.      Cranial Nerves: No cranial nerve deficit.   Psychiatric:         Mood and Affect: Mood and affect normal.         Behavior: Behavior normal.         Thought Content: " Thought content normal.         Judgment: Judgment normal.          Result Review                No Images in the past 120 days found..     The above data has been reviewed by LYDIA Jarvis 07/02/2025 12:46 EDT.          Patient Care Team:  Makenna Arteaga APRN as PCP - General (Nurse Practitioner)  Edis Alexandre MD as Consulting Physician (Colon and Rectal Surgery)         ASSESSMENT & PLAN    Diagnoses and all orders for this visit:    1. Encounter to establish care (Primary)    2. Hypertension, unspecified type  Assessment & Plan:  Blood pressure slightly above goal at present, continue to monitor, work on diet and exercise, continue hydrochlorothiazide 12.5 mg daily    Orders:  -     Comprehensive Metabolic Panel; Future  -     CBC & Differential; Future  -     Lipid Panel; Future  -     hydroCHLOROthiazide (MICROZIDE) 12.5 MG capsule; Take 1 capsule by mouth Daily.  Dispense: 90 capsule; Refill: 1    3. Thyroid disorder screen  -     TSH Rfx On Abnormal To Free T4; Future  -     PSA SCREENING; Future    4. Lipid screening  -     Lipid Panel; Future    5. Benign prostatic hyperplasia without lower urinary tract symptoms  -     PSA SCREENING; Future    6. Prostate cancer screening  -     PSA SCREENING; Future    7. Testosterone deficiency  Assessment & Plan:  History of testosterone deficiency, previously on testosterone replacement, has been off of the medication for some time, we will recheck today    Orders:  -     Testosterone; Future    8. Colon cancer screening  -     Ambulatory Referral For Screening Colonoscopy    9. Over weight  Assessment & Plan:  BMI 29.7 with diagnosis of hypertension, patient previously taking tirzepatide but felt like it was causing some depression, he discontinued this about 3 weeks ago and all depressive symptoms have resolved but patient has started to have weight regain and would like to trial a different medication of the same class to see if he will have the  same effect.  We will monitor closely for any side effects, follow-up in a month    Orders:  -     Semaglutide-Weight Management (Wegovy) 0.25 MG/0.5ML solution auto-injector; Inject 0.5 mL under the skin into the appropriate area as directed 1 (One) Time Per Week.  Dispense: 2 mL; Refill: 1    10. Erectile dysfunction, unspecified erectile dysfunction type  Assessment & Plan:  Reports well-controlled with daily Cialis and Viagra as needed, discussed with patient I have not prescribed these medications together before, will need to do some research before agreeing to prescribe, we will go ahead and send in Cialis    Orders:  -     tadalafil (CIALIS) 5 MG tablet; Take 1 tablet by mouth Daily.  Dispense: 90 tablet; Refill: 1         Tobacco Use: High Risk (7/2/2025)    Patient History     Smoking Tobacco Use: Never     Smokeless Tobacco Use: Current     Passive Exposure: Not on file       Follow Up     Return in about 3 months (around 10/2/2025), or if symptoms worsen or fail to improve.        Patient was given instructions and counseling regarding his condition or for health maintenance advice. Please see specific information pulled into the AVS if appropriate.   I have reviewed information obtained and documented by others and I have confirmed the accuracy of this documented note.    LYDIA Jarvis

## 2025-07-02 NOTE — ASSESSMENT & PLAN NOTE
History of testosterone deficiency, previously on testosterone replacement, has been off of the medication for some time, we will recheck today

## 2025-07-02 NOTE — ASSESSMENT & PLAN NOTE
Blood pressure slightly above goal at present, continue to monitor, work on diet and exercise, continue hydrochlorothiazide 12.5 mg daily

## 2025-07-02 NOTE — ASSESSMENT & PLAN NOTE
Reports well-controlled with daily Cialis and Viagra as needed, discussed with patient I have not prescribed these medications together before, will need to do some research before agreeing to prescribe, we will go ahead and send in Cialis

## 2025-07-03 ENCOUNTER — PATIENT ROUNDING (BHMG ONLY) (OUTPATIENT)
Dept: FAMILY MEDICINE CLINIC | Facility: CLINIC | Age: 45
End: 2025-07-03
Payer: COMMERCIAL

## 2025-07-10 ENCOUNTER — TELEPHONE (OUTPATIENT)
Dept: CARDIOLOGY | Age: 45
End: 2025-07-10

## 2025-07-18 ENCOUNTER — LAB (OUTPATIENT)
Dept: LAB | Facility: HOSPITAL | Age: 45
End: 2025-07-18
Payer: COMMERCIAL

## 2025-07-18 DIAGNOSIS — Z12.5 PROSTATE CANCER SCREENING: ICD-10-CM

## 2025-07-18 DIAGNOSIS — N40.0 BENIGN PROSTATIC HYPERPLASIA WITHOUT LOWER URINARY TRACT SYMPTOMS: ICD-10-CM

## 2025-07-18 DIAGNOSIS — I10 HYPERTENSION, UNSPECIFIED TYPE: ICD-10-CM

## 2025-07-18 DIAGNOSIS — Z13.220 LIPID SCREENING: ICD-10-CM

## 2025-07-18 DIAGNOSIS — Z13.29 THYROID DISORDER SCREEN: ICD-10-CM

## 2025-07-18 DIAGNOSIS — E34.9 TESTOSTERONE DEFICIENCY: ICD-10-CM

## 2025-07-18 LAB
ALBUMIN SERPL-MCNC: 4.3 G/DL (ref 3.5–5.2)
ALBUMIN/GLOB SERPL: 1.2 G/DL
ALP SERPL-CCNC: 57 U/L (ref 39–117)
ALT SERPL W P-5'-P-CCNC: 34 U/L (ref 1–41)
ANION GAP SERPL CALCULATED.3IONS-SCNC: 12.1 MMOL/L (ref 5–15)
AST SERPL-CCNC: 29 U/L (ref 1–40)
BASOPHILS # BLD AUTO: 0.07 10*3/MM3 (ref 0–0.2)
BASOPHILS NFR BLD AUTO: 1.5 % (ref 0–1.5)
BILIRUB SERPL-MCNC: 0.5 MG/DL (ref 0–1.2)
BUN SERPL-MCNC: 8 MG/DL (ref 6–20)
BUN/CREAT SERPL: 6.7 (ref 7–25)
CALCIUM SPEC-SCNC: 10.1 MG/DL (ref 8.6–10.5)
CHLORIDE SERPL-SCNC: 100 MMOL/L (ref 98–107)
CHOLEST SERPL-MCNC: 224 MG/DL (ref 0–200)
CO2 SERPL-SCNC: 25.9 MMOL/L (ref 22–29)
CREAT SERPL-MCNC: 1.19 MG/DL (ref 0.76–1.27)
DEPRECATED RDW RBC AUTO: 40.6 FL (ref 37–54)
EGFRCR SERPLBLD CKD-EPI 2021: 75.8 ML/MIN/1.73
EOSINOPHIL # BLD AUTO: 0.09 10*3/MM3 (ref 0–0.4)
EOSINOPHIL NFR BLD AUTO: 1.9 % (ref 0.3–6.2)
ERYTHROCYTE [DISTWIDTH] IN BLOOD BY AUTOMATED COUNT: 12.6 % (ref 12.3–15.4)
GLOBULIN UR ELPH-MCNC: 3.6 GM/DL
GLUCOSE SERPL-MCNC: 95 MG/DL (ref 65–99)
HCT VFR BLD AUTO: 48.4 % (ref 37.5–51)
HDLC SERPL-MCNC: 33 MG/DL (ref 40–60)
HGB BLD-MCNC: 16.5 G/DL (ref 13–17.7)
IMM GRANULOCYTES # BLD AUTO: 0.01 10*3/MM3 (ref 0–0.05)
IMM GRANULOCYTES NFR BLD AUTO: 0.2 % (ref 0–0.5)
LDLC SERPL CALC-MCNC: 169 MG/DL (ref 0–100)
LDLC/HDLC SERPL: 5.05 {RATIO}
LYMPHOCYTES # BLD AUTO: 2.11 10*3/MM3 (ref 0.7–3.1)
LYMPHOCYTES NFR BLD AUTO: 44.8 % (ref 19.6–45.3)
MCH RBC QN AUTO: 30.4 PG (ref 26.6–33)
MCHC RBC AUTO-ENTMCNC: 34.1 G/DL (ref 31.5–35.7)
MCV RBC AUTO: 89.3 FL (ref 79–97)
MONOCYTES # BLD AUTO: 0.4 10*3/MM3 (ref 0.1–0.9)
MONOCYTES NFR BLD AUTO: 8.5 % (ref 5–12)
NEUTROPHILS NFR BLD AUTO: 2.03 10*3/MM3 (ref 1.7–7)
NEUTROPHILS NFR BLD AUTO: 43.1 % (ref 42.7–76)
NRBC BLD AUTO-RTO: 0 /100 WBC (ref 0–0.2)
PLATELET # BLD AUTO: 289 10*3/MM3 (ref 140–450)
PMV BLD AUTO: 9.6 FL (ref 6–12)
POTASSIUM SERPL-SCNC: 4.3 MMOL/L (ref 3.5–5.2)
PROT SERPL-MCNC: 7.9 G/DL (ref 6–8.5)
PSA SERPL-MCNC: 1.21 NG/ML (ref 0–4)
RBC # BLD AUTO: 5.42 10*6/MM3 (ref 4.14–5.8)
SODIUM SERPL-SCNC: 138 MMOL/L (ref 136–145)
TESTOST SERPL-MCNC: 659 NG/DL (ref 249–836)
TRIGL SERPL-MCNC: 122 MG/DL (ref 0–150)
TSH SERPL DL<=0.05 MIU/L-ACNC: 1.62 UIU/ML (ref 0.27–4.2)
VLDLC SERPL-MCNC: 22 MG/DL (ref 5–40)
WBC NRBC COR # BLD AUTO: 4.71 10*3/MM3 (ref 3.4–10.8)

## 2025-07-18 PROCEDURE — G0103 PSA SCREENING: HCPCS

## 2025-07-18 PROCEDURE — 84403 ASSAY OF TOTAL TESTOSTERONE: CPT

## 2025-07-18 PROCEDURE — 80061 LIPID PANEL: CPT

## 2025-07-18 PROCEDURE — 80050 GENERAL HEALTH PANEL: CPT

## 2025-07-18 PROCEDURE — 36415 COLL VENOUS BLD VENIPUNCTURE: CPT

## 2025-07-23 ENCOUNTER — OFFICE VISIT (OUTPATIENT)
Dept: FAMILY MEDICINE CLINIC | Facility: CLINIC | Age: 45
End: 2025-07-23
Payer: COMMERCIAL

## 2025-07-23 VITALS
DIASTOLIC BLOOD PRESSURE: 92 MMHG | HEART RATE: 60 BPM | BODY MASS INDEX: 29.63 KG/M2 | HEIGHT: 70 IN | OXYGEN SATURATION: 100 % | WEIGHT: 207 LBS | SYSTOLIC BLOOD PRESSURE: 132 MMHG

## 2025-07-23 DIAGNOSIS — Z82.49 FAMILY HISTORY OF CORONARY ARTERY DISEASE: ICD-10-CM

## 2025-07-23 DIAGNOSIS — Z79.899 MEDICATION MANAGEMENT: ICD-10-CM

## 2025-07-23 DIAGNOSIS — E78.5 HYPERLIPIDEMIA, UNSPECIFIED HYPERLIPIDEMIA TYPE: ICD-10-CM

## 2025-07-23 DIAGNOSIS — N52.9 ERECTILE DYSFUNCTION, UNSPECIFIED ERECTILE DYSFUNCTION TYPE: ICD-10-CM

## 2025-07-23 DIAGNOSIS — E66.3 OVER WEIGHT: ICD-10-CM

## 2025-07-23 DIAGNOSIS — F41.9 ANXIETY: Primary | ICD-10-CM

## 2025-07-23 DIAGNOSIS — I10 HYPERTENSION, UNSPECIFIED TYPE: ICD-10-CM

## 2025-07-23 RX ORDER — SEMAGLUTIDE 0.25 MG/.5ML
0.25 INJECTION, SOLUTION SUBCUTANEOUS WEEKLY
Qty: 2 ML | Refills: 1 | Status: SHIPPED | OUTPATIENT
Start: 2025-07-23

## 2025-07-23 RX ORDER — SILDENAFIL 100 MG/1
100 TABLET, FILM COATED ORAL DAILY PRN
Qty: 30 TABLET | Refills: 0 | Status: SHIPPED | OUTPATIENT
Start: 2025-07-23

## 2025-07-23 RX ORDER — LORAZEPAM 0.5 MG/1
0.5 TABLET ORAL DAILY PRN
Qty: 20 TABLET | Refills: 0 | Status: CANCELLED | OUTPATIENT
Start: 2025-07-23

## 2025-07-23 RX ORDER — LORAZEPAM 1 MG/1
1 TABLET ORAL DAILY PRN
Qty: 10 TABLET | Refills: 0 | Status: SHIPPED | OUTPATIENT
Start: 2025-07-23

## 2025-07-23 RX ORDER — HYDROCHLOROTHIAZIDE 12.5 MG/1
12.5 CAPSULE ORAL DAILY
Qty: 90 CAPSULE | Refills: 1 | Status: SHIPPED | OUTPATIENT
Start: 2025-07-23

## 2025-07-23 NOTE — ASSESSMENT & PLAN NOTE
Previously sent prescription for Wegovy to [facility], reports he is unable to get the medication filled there, they do not carry it, we will resend to [facility] per patient request patient will stop all use of compounded tirzepatide once he gets the Wegovy

## 2025-07-23 NOTE — ASSESSMENT & PLAN NOTE
Discussed with patient for now I would like him to simply monitor his blood pressure, it was a bit elevated at last office visit, but was actually on the low end of normal in May.  Patient's weight has increased just a bit since he had stopped his tirzepatide for a time, we are switching him over to Wegovy, I am hopeful that his patient continues to lose some additional weight his blood pressure will continue to improve.  Discussed goal 130/80 or less, he has an appointment with cardiology in a couple of weeks and will discuss the blood pressure with them at that time, for now we will continue the hydrochlorothiazide and he will continue to monitor

## 2025-07-23 NOTE — PROGRESS NOTES
Chief Complaint  Hypertension, anxiety    SUBJECTIVE  Julius Gonzales  presents to [facility] due to BP reading elevated at home.     Pt states that BP has been up and down some- states that he he's seen it as high as 98 diastolic, but has also had issues with it getting too low.  States that he has been taking a compounded tirzepatide injection once every 10 days and he feels like his blood pressure gets high around the time this wears off. (He is supposed to take it every 7 days but it makes him feel jittery so he stretches it to 10 days) he reports that it is compounded with B12 and possibly some other stuff he cannot remember for sure.  He was concerned initially that the jitteriness from the injection was related to his blood sugar getting low but if he eats it does not really seem to make any difference and on his recent labs his blood sugar was 95 on fasting    Patient would also like to discuss his ED.  States that he stopped the Cialis about a week ago, states that if he cannot take Cialis and Viagra together he would prefer to just take the Viagra as needed because the daily Cialis really does not help him with his ED.  He was initially started on this for perceived difficulty with urination, states that he did see urology and had this evaluated and was told that there was nothing wrong with his prostate  Pt states he saw dr. Borrero about 1-2 years ago and he gave the OK to take the viagra on top of the daily cialis-we do not have these records and we will request them    Pt c/o some anxiety- states it always bad this time of year.  States that he has some mild general anxiety, but this time of year it is much worse (June, July, August) due to sig. increase in work load. States that he was prescribed Ativan for this several years ago, and he has been using this ever since.  States that he uses very sparingly and has not even filled the prescription in over a year.  Review  "of records do show previous Ativan 2 mg on medication list    History of Present Illness  Past Medical History:   Diagnosis Date    Back pain     Chest pain     GERD (gastroesophageal reflux disease)     Hypertension     Rash       Family History   Problem Relation Age of Onset    Hypertension Mother     Hypertension Father     Stroke Sister     Diabetes Paternal Uncle     Heart attack Maternal Grandmother     Stroke Maternal Grandmother     Hypertension Maternal Grandmother     Diabetes Maternal Grandmother     Hypertension Maternal Grandfather     Heart disease Paternal Grandmother     Heart attack Paternal Grandmother     Hypertension Paternal Grandmother     Diabetes Paternal Grandmother     Sudden death Paternal Grandmother 58        \"heart explosion\"    Hypertension Paternal Grandfather     Aneurysm Paternal Grandfather     Colon cancer Neg Hx       Past Surgical History:   Procedure Laterality Date    HERNIA REPAIR          Current Outpatient Medications:     hydroCHLOROthiazide (MICROZIDE) 12.5 MG capsule, Take 1 capsule by mouth Daily., Disp: 90 capsule, Rfl: 1    Semaglutide-Weight Management (Wegovy) 0.25 MG/0.5ML solution auto-injector, Inject 0.5 mL under the skin into the appropriate area as directed 1 (One) Time Per Week., Disp: 2 mL, Rfl: 1    sildenafil (VIAGRA) 100 MG tablet, Take 1 tablet by mouth Daily As Needed for Erectile Dysfunction., Disp: 30 tablet, Rfl: 0    LORazepam (Ativan) 1 MG tablet, Take 1 tablet by mouth Daily As Needed for Anxiety., Disp: 10 tablet, Rfl: 0    OBJECTIVE  Vital Signs:   /92   Pulse 60   Ht 177.8 cm (70\")   Wt 93.9 kg (207 lb)   SpO2 100%   BMI 29.70 kg/m²    Estimated body mass index is 29.7 kg/m² as calculated from the following:    Height as of this encounter: 177.8 cm (70\").    Weight as of this encounter: 93.9 kg (207 lb).     Wt Readings from Last 3 Encounters:   07/23/25 93.9 kg (207 lb)   07/02/25 93.9 kg (207 lb)   05/13/24 92.9 kg (204 lb 11.2 " oz)     BP Readings from Last 3 Encounters:   07/23/25 132/92   07/02/25 138/82   05/13/24 108/68       Physical Exam  Vitals reviewed.   Constitutional:       Appearance: Normal appearance. He is well-developed.   HENT:      Head: Normocephalic and atraumatic.      Right Ear: External ear normal.      Left Ear: External ear normal.   Eyes:      Conjunctiva/sclera: Conjunctivae normal.      Pupils: Pupils are equal, round, and reactive to light.   Cardiovascular:      Rate and Rhythm: Normal rate and regular rhythm.      Heart sounds: No murmur heard.     No friction rub. No gallop.   Pulmonary:      Effort: Pulmonary effort is normal.      Breath sounds: Normal breath sounds. No wheezing or rhonchi.   Skin:     General: Skin is warm and dry.   Neurological:      Mental Status: He is alert and oriented to person, place, and time.      Cranial Nerves: No cranial nerve deficit.   Psychiatric:         Mood and Affect: Mood and affect normal.         Behavior: Behavior normal.         Thought Content: Thought content normal.         Judgment: Judgment normal.          Result Review    CMP          7/18/2025    10:09   CMP   Glucose 95    BUN 8.0    Creatinine 1.19    EGFR 75.8    Sodium 138    Potassium 4.3    Chloride 100    Calcium 10.1    Total Protein 7.9    Albumin 4.3    Globulin 3.6    Total Bilirubin 0.5    Alkaline Phosphatase 57    AST (SGOT) 29    ALT (SGPT) 34    Albumin/Globulin Ratio 1.2    BUN/Creatinine Ratio 6.7    Anion Gap 12.1      CBC          7/18/2025    10:09   CBC   WBC 4.71    RBC 5.42    Hemoglobin 16.5    Hematocrit 48.4    MCV 89.3    MCH 30.4    MCHC 34.1    RDW 12.6    Platelets 289      Lipid Panel          7/18/2025    10:09   Lipid Panel   Total Cholesterol 224    Triglycerides 122    HDL Cholesterol 33    VLDL Cholesterol 22    LDL Cholesterol  169    LDL/HDL Ratio 5.05      TSH          7/18/2025    10:09   TSH   TSH 1.620        Lab Results   Component Value Date    Testosterone,  Total 659.00 07/18/2025       No Images in the past 120 days found..     The above data has been reviewed by LYDIA Jarvis 07/23/2025 08:16 EDT.          Patient Care Team:  Makenna Arteaga APRN as PCP - General (Nurse Practitioner)  Edis Alexandre MD as Consulting Physician (Colon and Rectal Surgery)            ASSESSMENT & PLAN    Diagnoses and all orders for this visit:    1. Anxiety (Primary)  Assessment & Plan:  Discussed with patient that Ativan is not the typical go-to for treating mild anxiety, however as patient does seem to be using this extremely sparingly and has been doing so for a number of years we will go ahead and give a refill, I will prescribe small supply of 1 mg Ativan, patient will need to return urine drug screen and controlled substance contract signed today.  Case appropriate    Orders:  -     LORazepam (Ativan) 1 MG tablet; Take 1 tablet by mouth Daily As Needed for Anxiety.  Dispense: 10 tablet; Refill: 0    2. Over weight  Assessment & Plan:  Previously sent prescription for Wegovy to [facility], reports he is unable to get the medication filled there, they do not carry it, [facility] patient will stop all use of compounded tirzepatide once he gets the Wegovy     Orders:  -     Semaglutide-Weight Management (Wegovy) 0.25 MG/0.5ML solution auto-injector; Inject 0.5 mL under the skin into the appropriate area as directed 1 (One) Time Per Week.  Dispense: 2 mL; Refill: 1    3. Hypertension, unspecified type  Assessment & Plan:  Discussed with patient for now I would like him to simply monitor his blood pressure, it was a bit elevated at last office visit, but was actually on the low end of normal in May.  Patient's weight has increased just a bit since he had stopped his tirzepatide for a time, we are switching him over to Wegovy, I am hopeful that his patient continues to lose some additional weight his blood pressure will  continue to improve.  Discussed goal 130/80 or less, he has an appointment with cardiology in a couple of weeks and will discuss the blood pressure with them at that time, for now we will continue the hydrochlorothiazide and he will continue to monitor    Orders:  -     hydroCHLOROthiazide (MICROZIDE) 12.5 MG capsule; Take 1 capsule by mouth Daily.  Dispense: 90 capsule; Refill: 1    4. Erectile dysfunction, unspecified erectile dysfunction type  Assessment & Plan:  Patient discontinued the daily Cialis approximately 1 week ago, reports that he did not feel it was very beneficial for his ED, he will monitor to see if he has any reoccurrence of difficulty with urination.  We will prescribe Viagra as needed as patient reports this works very well for him, we have a follow-up in about 3 months.  We are also requesting records from where patient previously saw urology for review    Orders:  -     sildenafil (VIAGRA) 100 MG tablet; Take 1 tablet by mouth Daily As Needed for Erectile Dysfunction.  Dispense: 30 tablet; Refill: 0    5. Hyperlipidemia, unspecified hyperlipidemia type  Assessment & Plan:  We discussed patient's lab results at length today including his hyperlipidemia.  Patient reports he was previously on Lipitor many years ago but it was discontinued as he was experiencing some angioedema and they were trying to find the culprit, he has never been back on it since.  He does have some family history of coronary artery disease in grandparents, but both parents thus far have not had any problems.  He would prefer not to take a cholesterol medication if possible, after further discussion we have decided to proceed with CT calcium scoring for further evaluation     Orders:  -     CT Cardiac Calcium Score Without Dye; Future    6. Family history of coronary artery disease  -     CT Cardiac Calcium Score Without Dye; Future    7. Medication management  -     Urine Drug Screen - Urine, Clean Catch; Future          Tobacco Use: High Risk (7/23/2025)    Patient History     Smoking Tobacco Use: Never     Smokeless Tobacco Use: Current     Passive Exposure: Not on file       Follow Up     Return in about 3 months (around 10/23/2025), or if symptoms worsen or fail to improve.        Patient was given instructions and counseling regarding his condition or for health maintenance advice. Please see specific information pulled into the AVS if appropriate.   I have reviewed information obtained and documented by others and I have confirmed the accuracy of this documented note.    Makenna Arteaga APRN

## 2025-07-23 NOTE — ASSESSMENT & PLAN NOTE
Discussed with patient that Ativan is not the typical go-to for treating mild anxiety, however as patient does seem to be using this extremely sparingly and has been doing so for a number of years we will go ahead and give a refill, I will prescribe small supply of 1 mg Ativan, patient will need to return urine drug screen and controlled substance contract signed today.  Case tam

## 2025-07-23 NOTE — ASSESSMENT & PLAN NOTE
Patient discontinued the daily Cialis approximately 1 week ago, reports that he did not feel it was very beneficial for his ED, he will monitor to see if he has any reoccurrence of difficulty with urination.  We will prescribe Viagra as needed as patient reports this works very well for him, we have a follow-up in about 3 months.  We are also requesting records from where patient previously saw urology for review

## 2025-07-23 NOTE — ASSESSMENT & PLAN NOTE
We discussed patient's lab results at length today including his hyperlipidemia.  Patient reports he was previously on Lipitor many years ago but it was discontinued as he was experiencing some angioedema and they were trying to find the culprit, he has never been back on it since.  He does have some family history of coronary artery disease in grandparents, but both parents thus far have not had any problems.  He would prefer not to take a cholesterol medication if possible, after further discussion we have decided to proceed with CT calcium scoring for further evaluation

## 2025-07-29 ENCOUNTER — TELEPHONE (OUTPATIENT)
Dept: FAMILY MEDICINE CLINIC | Facility: CLINIC | Age: 45
End: 2025-07-29
Payer: COMMERCIAL

## 2025-07-29 NOTE — TELEPHONE ENCOUNTER
Insurance denied the Wegovy, pt previously tried Zepbound but had issues with depression while on it. Please advise if another options

## 2025-08-08 ENCOUNTER — OFFICE VISIT (OUTPATIENT)
Dept: CARDIOLOGY | Age: 45
End: 2025-08-08
Payer: COMMERCIAL

## 2025-08-08 VITALS
HEIGHT: 70 IN | SYSTOLIC BLOOD PRESSURE: 138 MMHG | WEIGHT: 213 LBS | BODY MASS INDEX: 30.49 KG/M2 | HEART RATE: 59 BPM | DIASTOLIC BLOOD PRESSURE: 100 MMHG

## 2025-08-08 DIAGNOSIS — I10 HYPERTENSION, UNSPECIFIED TYPE: ICD-10-CM

## 2025-08-08 PROCEDURE — 99204 OFFICE O/P NEW MOD 45 MIN: CPT | Performed by: INTERNAL MEDICINE

## 2025-08-08 PROCEDURE — 93000 ELECTROCARDIOGRAM COMPLETE: CPT | Performed by: INTERNAL MEDICINE

## 2025-08-08 RX ORDER — HYDROCHLOROTHIAZIDE 25 MG/1
25 TABLET ORAL DAILY
Qty: 30 TABLET | Refills: 11 | Status: SHIPPED | OUTPATIENT
Start: 2025-08-08

## 2025-08-13 ENCOUNTER — HOSPITAL ENCOUNTER (OUTPATIENT)
Dept: CT IMAGING | Facility: HOSPITAL | Age: 45
Discharge: HOME | End: 2025-08-13
Admitting: NURSE PRACTITIONER

## 2025-08-13 DIAGNOSIS — Z82.49 FAMILY HISTORY OF CORONARY ARTERY DISEASE: ICD-10-CM

## 2025-08-13 DIAGNOSIS — E78.5 HYPERLIPIDEMIA, UNSPECIFIED HYPERLIPIDEMIA TYPE: ICD-10-CM

## 2025-08-13 PROCEDURE — 75571 CT HRT W/O DYE W/CA TEST: CPT

## 2025-08-15 ENCOUNTER — TELEPHONE (OUTPATIENT)
Dept: FAMILY MEDICINE CLINIC | Facility: CLINIC | Age: 45
End: 2025-08-15
Payer: COMMERCIAL

## 2025-08-18 ENCOUNTER — OFFICE VISIT (OUTPATIENT)
Dept: FAMILY MEDICINE CLINIC | Facility: CLINIC | Age: 45
End: 2025-08-18
Payer: COMMERCIAL

## 2025-08-18 VITALS
OXYGEN SATURATION: 99 % | DIASTOLIC BLOOD PRESSURE: 88 MMHG | HEART RATE: 83 BPM | BODY MASS INDEX: 30.64 KG/M2 | HEIGHT: 70 IN | SYSTOLIC BLOOD PRESSURE: 146 MMHG | WEIGHT: 214 LBS

## 2025-08-18 DIAGNOSIS — R20.2 NUMBNESS AND TINGLING IN BOTH HANDS: ICD-10-CM

## 2025-08-18 DIAGNOSIS — F41.9 ANXIETY: ICD-10-CM
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
## 2025-08-18 DIAGNOSIS — R20.0 NUMBNESS AND TINGLING IN BOTH HANDS: ICD-10-CM

## 2025-08-18 DIAGNOSIS — M25.50 ARTHRALGIA, UNSPECIFIED JOINT: ICD-10-CM

## 2025-08-18 DIAGNOSIS — R25.2 MUSCLE CRAMP: Primary | ICD-10-CM

## 2025-08-18 DIAGNOSIS — I10 HYPERTENSION, UNSPECIFIED TYPE: ICD-10-CM

## 2025-08-18 PROCEDURE — 99214 OFFICE O/P EST MOD 30 MIN: CPT | Performed by: NURSE PRACTITIONER

## 2025-08-18 RX ORDER — OLMESARTAN MEDOXOMIL 5 MG/1
5 TABLET ORAL DAILY
Qty: 30 TABLET | Refills: 2 | Status: SHIPPED | OUTPATIENT
Start: 2025-08-18

## 2025-08-18 RX ORDER — FLUOXETINE 10 MG/1
10 CAPSULE ORAL DAILY
Qty: 30 CAPSULE | Refills: 1 | Status: SHIPPED | OUTPATIENT
Start: 2025-08-18

## 2025-08-19 DIAGNOSIS — N52.9 ERECTILE DYSFUNCTION, UNSPECIFIED ERECTILE DYSFUNCTION TYPE: ICD-10-CM

## 2025-08-19 RX ORDER — SILDENAFIL 100 MG/1
100 TABLET, FILM COATED ORAL DAILY PRN
Qty: 30 TABLET | Refills: 0 | Status: SHIPPED | OUTPATIENT
Start: 2025-08-19

## 2025-08-20 ENCOUNTER — LAB (OUTPATIENT)
Dept: LAB | Facility: HOSPITAL | Age: 45
End: 2025-08-20
Payer: COMMERCIAL

## 2025-08-20 DIAGNOSIS — R25.2 MUSCLE CRAMP: ICD-10-CM

## 2025-08-20 DIAGNOSIS — M25.50 ARTHRALGIA, UNSPECIFIED JOINT: ICD-10-CM

## 2025-08-20 DIAGNOSIS — R20.2 NUMBNESS AND TINGLING IN BOTH HANDS: ICD-10-CM

## 2025-08-20 DIAGNOSIS — R20.0 NUMBNESS AND TINGLING IN BOTH HANDS: ICD-10-CM

## 2025-08-20 DIAGNOSIS — I10 HYPERTENSION, UNSPECIFIED TYPE: ICD-10-CM

## 2025-08-20 DIAGNOSIS — Z79.899 MEDICATION MANAGEMENT: ICD-10-CM
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
## 2025-08-20 LAB
25(OH)D3 SERPL-MCNC: 41.1 NG/ML (ref 30–100)
AMPHET+METHAMPHET UR QL: NEGATIVE
AMPHETAMINES UR QL: NEGATIVE
ANION GAP SERPL CALCULATED.3IONS-SCNC: 14.7 MMOL/L (ref 5–15)
BARBITURATES UR QL SCN: NEGATIVE
BASOPHILS # BLD AUTO: 0.05 10*3/MM3 (ref 0–0.2)
BASOPHILS NFR BLD AUTO: 0.9 % (ref 0–1.5)
BENZODIAZ UR QL SCN: NEGATIVE
BUN SERPL-MCNC: 8 MG/DL (ref 6–20)
BUN/CREAT SERPL: 8.2 (ref 7–25)
BUPRENORPHINE SERPL-MCNC: NEGATIVE NG/ML
CALCIUM SPEC-SCNC: 10.3 MG/DL (ref 8.6–10.5)
CANNABINOIDS SERPL QL: NEGATIVE
CHLORIDE SERPL-SCNC: 101 MMOL/L (ref 98–107)
CHROMATIN AB SERPL-ACNC: <10 IU/ML (ref 0–14)
CK SERPL-CCNC: 314 U/L (ref 20–200)
CO2 SERPL-SCNC: 26.3 MMOL/L (ref 22–29)
COCAINE UR QL: NEGATIVE
CREAT SERPL-MCNC: 0.98 MG/DL (ref 0.76–1.27)
CRP SERPL-MCNC: <0.3 MG/DL (ref 0–0.5)
DEPRECATED RDW RBC AUTO: 41.9 FL (ref 37–54)
EGFRCR SERPLBLD CKD-EPI 2021: 95.7 ML/MIN/1.73
EOSINOPHIL # BLD AUTO: 0.08 10*3/MM3 (ref 0–0.4)
EOSINOPHIL NFR BLD AUTO: 1.4 % (ref 0.3–6.2)
ERYTHROCYTE [DISTWIDTH] IN BLOOD BY AUTOMATED COUNT: 12.9 % (ref 12.3–15.4)
ERYTHROCYTE [SEDIMENTATION RATE] IN BLOOD: 10 MM/HR (ref 0–15)
FENTANYL UR-MCNC: NEGATIVE NG/ML
GLUCOSE SERPL-MCNC: 99 MG/DL (ref 65–99)
HCT VFR BLD AUTO: 50.4 % (ref 37.5–51)
HGB BLD-MCNC: 17.2 G/DL (ref 13–17.7)
IMM GRANULOCYTES # BLD AUTO: 0.02 10*3/MM3 (ref 0–0.05)
IMM GRANULOCYTES NFR BLD AUTO: 0.4 % (ref 0–0.5)
LYMPHOCYTES # BLD AUTO: 2.52 10*3/MM3 (ref 0.7–3.1)
LYMPHOCYTES NFR BLD AUTO: 44.7 % (ref 19.6–45.3)
MAGNESIUM SERPL-MCNC: 2.3 MG/DL (ref 1.6–2.6)
MCH RBC QN AUTO: 30.7 PG (ref 26.6–33)
MCHC RBC AUTO-ENTMCNC: 34.1 G/DL (ref 31.5–35.7)
MCV RBC AUTO: 89.8 FL (ref 79–97)
METHADONE UR QL SCN: NEGATIVE
MONOCYTES # BLD AUTO: 0.49 10*3/MM3 (ref 0.1–0.9)
MONOCYTES NFR BLD AUTO: 8.7 % (ref 5–12)
NEUTROPHILS NFR BLD AUTO: 2.48 10*3/MM3 (ref 1.7–7)
NEUTROPHILS NFR BLD AUTO: 43.9 % (ref 42.7–76)
NRBC BLD AUTO-RTO: 0 /100 WBC (ref 0–0.2)
OPIATES UR QL: NEGATIVE
OXYCODONE UR QL SCN: NEGATIVE
PCP UR QL SCN: NEGATIVE
PLATELET # BLD AUTO: 282 10*3/MM3 (ref 140–450)
PMV BLD AUTO: 9.6 FL (ref 6–12)
POTASSIUM SERPL-SCNC: 3.7 MMOL/L (ref 3.5–5.2)
RBC # BLD AUTO: 5.61 10*6/MM3 (ref 4.14–5.8)
SODIUM SERPL-SCNC: 142 MMOL/L (ref 136–145)
TRICYCLICS UR QL SCN: NEGATIVE
VIT B12 BLD-MCNC: 538 PG/ML (ref 211–946)
WBC NRBC COR # BLD AUTO: 5.64 10*3/MM3 (ref 3.4–10.8)

## 2025-08-20 PROCEDURE — 86431 RHEUMATOID FACTOR QUANT: CPT

## 2025-08-20 PROCEDURE — 86753 PROTOZOA ANTIBODY NOS: CPT

## 2025-08-20 PROCEDURE — 87484 EHRLICHA CHAFFEENSIS AMP PRB: CPT

## 2025-08-20 PROCEDURE — 36415 COLL VENOUS BLD VENIPUNCTURE: CPT

## 2025-08-20 PROCEDURE — 80307 DRUG TEST PRSMV CHEM ANLYZR: CPT

## 2025-08-20 PROCEDURE — 86038 ANTINUCLEAR ANTIBODIES: CPT

## 2025-08-20 PROCEDURE — 85025 COMPLETE CBC W/AUTO DIFF WBC: CPT

## 2025-08-20 PROCEDURE — 86618 LYME DISEASE ANTIBODY: CPT

## 2025-08-20 PROCEDURE — 80048 BASIC METABOLIC PNL TOTAL CA: CPT

## 2025-08-20 PROCEDURE — 82306 VITAMIN D 25 HYDROXY: CPT

## 2025-08-20 PROCEDURE — 82550 ASSAY OF CK (CPK): CPT

## 2025-08-20 PROCEDURE — 86200 CCP ANTIBODY: CPT

## 2025-08-20 PROCEDURE — 86140 C-REACTIVE PROTEIN: CPT

## 2025-08-20 PROCEDURE — 82607 VITAMIN B-12: CPT

## 2025-08-20 PROCEDURE — 83735 ASSAY OF MAGNESIUM: CPT

## 2025-08-20 PROCEDURE — 85652 RBC SED RATE AUTOMATED: CPT

## 2025-08-20 RX ORDER — OLMESARTAN MEDOXOMIL 5 MG/1
5 TABLET ORAL DAILY
Qty: 90 TABLET | OUTPATIENT
Start: 2025-08-20

## 2025-08-21 LAB
ANA SER QL: NEGATIVE
B BURGDOR IGG+IGM SER QL IA: NEGATIVE
CCP IGA+IGG SERPL IA-ACNC: 5 UNITS (ref 0–19)

## 2025-08-25 LAB — EHRLICHIA DNA SPEC QL NAA+PROBE: NEGATIVE

## 2025-08-26 LAB
B DUNCANI IGG TITR SER IF: NORMAL {TITER}
SUBSTRATE: NORMAL

## 2025-08-27 ENCOUNTER — TELEPHONE (OUTPATIENT)
Dept: FAMILY MEDICINE CLINIC | Facility: CLINIC | Age: 45
End: 2025-08-27
Payer: COMMERCIAL

## 2025-08-27 DIAGNOSIS — M62.81 MUSCLE WEAKNESS: Primary | ICD-10-CM

## 2025-08-27 DIAGNOSIS — R20.2 NUMBNESS AND TINGLING IN BOTH HANDS: ICD-10-CM

## 2025-08-27 DIAGNOSIS — R20.0 NUMBNESS AND TINGLING IN BOTH HANDS: ICD-10-CM

## 2025-08-27 DIAGNOSIS — R74.8 ELEVATED CK: ICD-10-CM
